# Patient Record
Sex: MALE | Race: WHITE | Employment: FULL TIME | URBAN - METROPOLITAN AREA
[De-identification: names, ages, dates, MRNs, and addresses within clinical notes are randomized per-mention and may not be internally consistent; named-entity substitution may affect disease eponyms.]

---

## 2017-01-17 ENCOUNTER — TELEPHONE (OUTPATIENT)
Dept: PRIMARY CARE CLINIC | Age: 47
End: 2017-01-17

## 2017-01-18 DIAGNOSIS — E66.1 MORBID DRUG-INDUCED OBESITY: Primary | ICD-10-CM

## 2017-01-27 ENCOUNTER — NURSE ONLY (OUTPATIENT)
Dept: PRIMARY CARE CLINIC | Age: 47
End: 2017-01-27

## 2017-01-27 DIAGNOSIS — E29.1 HYPOGONADISM IN MALE: Primary | ICD-10-CM

## 2017-01-27 PROCEDURE — 96372 THER/PROPH/DIAG INJ SC/IM: CPT | Performed by: FAMILY MEDICINE

## 2017-01-27 RX ORDER — TESTOSTERONE CYPIONATE 200 MG/ML
200 INJECTION INTRAMUSCULAR ONCE
Status: COMPLETED | OUTPATIENT
Start: 2017-01-27 | End: 2017-01-27

## 2017-01-27 RX ADMIN — TESTOSTERONE CYPIONATE 200 MG: 200 INJECTION INTRAMUSCULAR at 09:12

## 2017-02-08 ENCOUNTER — HOSPITAL ENCOUNTER (OUTPATIENT)
Dept: NUTRITION | Age: 47
Discharge: HOME | End: 2017-02-08
Payer: COMMERCIAL

## 2017-02-08 PROCEDURE — 97802 MEDICAL NUTRITION INDIV IN: CPT

## 2017-02-17 ENCOUNTER — NURSE ONLY (OUTPATIENT)
Dept: PRIMARY CARE CLINIC | Age: 47
End: 2017-02-17

## 2017-02-17 DIAGNOSIS — E29.1 HYPOGONADISM IN MALE: Primary | ICD-10-CM

## 2017-02-17 PROCEDURE — 96372 THER/PROPH/DIAG INJ SC/IM: CPT | Performed by: FAMILY MEDICINE

## 2017-02-17 RX ORDER — TESTOSTERONE CYPIONATE 200 MG/ML
200 INJECTION INTRAMUSCULAR ONCE
Status: COMPLETED | OUTPATIENT
Start: 2017-02-17 | End: 2017-02-17

## 2017-02-17 RX ADMIN — TESTOSTERONE CYPIONATE 200 MG: 200 INJECTION INTRAMUSCULAR at 08:28

## 2019-01-21 ENCOUNTER — OFFICE VISIT (OUTPATIENT)
Dept: PRIMARY CARE CLINIC | Age: 49
End: 2019-01-21
Payer: COMMERCIAL

## 2019-01-21 VITALS
HEIGHT: 73 IN | OXYGEN SATURATION: 98 % | DIASTOLIC BLOOD PRESSURE: 84 MMHG | SYSTOLIC BLOOD PRESSURE: 138 MMHG | TEMPERATURE: 98.5 F | HEART RATE: 68 BPM | RESPIRATION RATE: 16 BRPM | WEIGHT: 315 LBS | BODY MASS INDEX: 41.75 KG/M2

## 2019-01-21 DIAGNOSIS — M51.36 DDD (DEGENERATIVE DISC DISEASE), LUMBAR: ICD-10-CM

## 2019-01-21 DIAGNOSIS — N52.01 ERECTILE DYSFUNCTION DUE TO ARTERIAL INSUFFICIENCY: ICD-10-CM

## 2019-01-21 DIAGNOSIS — E78.5 DYSLIPIDEMIA: ICD-10-CM

## 2019-01-21 DIAGNOSIS — I10 HTN (HYPERTENSION), BENIGN: Primary | ICD-10-CM

## 2019-01-21 DIAGNOSIS — R73.9 HYPERGLYCEMIA: ICD-10-CM

## 2019-01-21 DIAGNOSIS — R53.82 CHRONIC FATIGUE: ICD-10-CM

## 2019-01-21 PROCEDURE — G8419 CALC BMI OUT NRM PARAM NOF/U: HCPCS | Performed by: FAMILY MEDICINE

## 2019-01-21 PROCEDURE — G8484 FLU IMMUNIZE NO ADMIN: HCPCS | Performed by: FAMILY MEDICINE

## 2019-01-21 PROCEDURE — G8427 DOCREV CUR MEDS BY ELIG CLIN: HCPCS | Performed by: FAMILY MEDICINE

## 2019-01-21 PROCEDURE — 4004F PT TOBACCO SCREEN RCVD TLK: CPT | Performed by: FAMILY MEDICINE

## 2019-01-21 PROCEDURE — 99214 OFFICE O/P EST MOD 30 MIN: CPT | Performed by: FAMILY MEDICINE

## 2019-01-21 RX ORDER — METFORMIN HYDROCHLORIDE 500 MG/1
TABLET, EXTENDED RELEASE ORAL
COMMUNITY
Start: 2018-12-28

## 2019-01-21 RX ORDER — OXYCODONE HYDROCHLORIDE AND ACETAMINOPHEN 5; 325 MG/1; MG/1
TABLET ORAL
COMMUNITY
Start: 2018-12-27

## 2019-01-21 RX ORDER — SILDENAFIL 100 MG/1
100 TABLET, FILM COATED ORAL DAILY PRN
Qty: 10 TABLET | Refills: 2 | Status: SHIPPED | OUTPATIENT
Start: 2019-01-21

## 2019-01-21 RX ORDER — FENOFIBRATE 160 MG/1
TABLET ORAL
COMMUNITY
Start: 2019-01-06

## 2019-01-21 RX ORDER — LISINOPRIL 40 MG/1
TABLET ORAL
COMMUNITY
Start: 2019-01-03

## 2019-01-21 RX ORDER — ATORVASTATIN CALCIUM 20 MG/1
TABLET, FILM COATED ORAL
COMMUNITY
Start: 2018-12-29 | End: 2019-01-21

## 2019-01-21 RX ORDER — METOPROLOL SUCCINATE 100 MG/1
TABLET, EXTENDED RELEASE ORAL
COMMUNITY
Start: 2019-01-14

## 2019-01-21 RX ORDER — ALBUTEROL SULFATE 2.5 MG/3ML
SOLUTION RESPIRATORY (INHALATION)
COMMUNITY
Start: 2018-11-26

## 2019-01-21 RX ORDER — DULOXETIN HYDROCHLORIDE 60 MG/1
CAPSULE, DELAYED RELEASE ORAL
COMMUNITY
Start: 2019-01-14

## 2019-01-21 RX ORDER — BACLOFEN 10 MG/1
TABLET ORAL
COMMUNITY
Start: 2016-01-14

## 2019-01-21 RX ORDER — GLIPIZIDE 5 MG/1
TABLET ORAL
COMMUNITY
Start: 2019-01-14

## 2019-01-21 RX ORDER — AMLODIPINE BESYLATE 5 MG/1
TABLET ORAL
COMMUNITY
Start: 2019-01-14 | End: 2019-01-21

## 2019-01-21 RX ORDER — SILDENAFIL CITRATE 20 MG/1
20 TABLET ORAL PRN
Qty: 30 TABLET | Refills: 2 | Status: SHIPPED | OUTPATIENT
Start: 2019-01-21 | End: 2019-01-21 | Stop reason: SDUPTHER

## 2019-01-21 RX ORDER — SILDENAFIL CITRATE 20 MG/1
20 TABLET ORAL PRN
Qty: 30 TABLET | Refills: 2 | Status: SHIPPED | OUTPATIENT
Start: 2019-01-21

## 2019-01-21 ASSESSMENT — ENCOUNTER SYMPTOMS
EYES NEGATIVE: 1
RESPIRATORY NEGATIVE: 1
NAUSEA: 0
PHOTOPHOBIA: 0
DIARRHEA: 0
SHORTNESS OF BREATH: 0
CONSTIPATION: 0
BACK PAIN: 1
VOMITING: 0
ABDOMINAL PAIN: 0
BOWEL INCONTINENCE: 0
APNEA: 0
EYE DISCHARGE: 0
COUGH: 0
BLOOD IN STOOL: 0
CHEST TIGHTNESS: 0
GASTROINTESTINAL NEGATIVE: 1

## 2019-01-21 ASSESSMENT — PATIENT HEALTH QUESTIONNAIRE - PHQ9
SUM OF ALL RESPONSES TO PHQ QUESTIONS 1-9: 0
SUM OF ALL RESPONSES TO PHQ QUESTIONS 1-9: 0
1. LITTLE INTEREST OR PLEASURE IN DOING THINGS: 0
SUM OF ALL RESPONSES TO PHQ9 QUESTIONS 1 & 2: 0
2. FEELING DOWN, DEPRESSED OR HOPELESS: 0

## 2019-01-24 DIAGNOSIS — E78.5 DYSLIPIDEMIA: ICD-10-CM

## 2019-01-24 DIAGNOSIS — R53.82 CHRONIC FATIGUE: ICD-10-CM

## 2019-01-24 LAB
ALBUMIN SERPL-MCNC: 4.4 G/DL (ref 3.9–4.9)
ALP BLD-CCNC: 64 U/L (ref 35–104)
ALT SERPL-CCNC: 21 U/L (ref 0–41)
ANION GAP SERPL CALCULATED.3IONS-SCNC: 13 MEQ/L (ref 7–13)
AST SERPL-CCNC: 38 U/L (ref 0–40)
BASOPHILS ABSOLUTE: 0.1 K/UL (ref 0–0.2)
BASOPHILS RELATIVE PERCENT: 1 %
BILIRUB SERPL-MCNC: 0.3 MG/DL (ref 0–1.2)
BUN BLDV-MCNC: 14 MG/DL (ref 6–20)
CALCIUM SERPL-MCNC: 9.6 MG/DL (ref 8.6–10.2)
CHLORIDE BLD-SCNC: 93 MEQ/L (ref 98–107)
CHOLESTEROL, TOTAL: 132 MG/DL (ref 0–199)
CO2: 27 MEQ/L (ref 22–29)
CREAT SERPL-MCNC: 0.65 MG/DL (ref 0.7–1.2)
EOSINOPHILS ABSOLUTE: 0.3 K/UL (ref 0–0.7)
EOSINOPHILS RELATIVE PERCENT: 2.9 %
GFR AFRICAN AMERICAN: >60
GFR NON-AFRICAN AMERICAN: >60
GLOBULIN: 3.2 G/DL (ref 2.3–3.5)
GLUCOSE BLD-MCNC: 155 MG/DL (ref 74–109)
HCT VFR BLD CALC: 40 % (ref 42–52)
HDLC SERPL-MCNC: 21 MG/DL (ref 40–59)
HEMOGLOBIN: 13.5 G/DL (ref 14–18)
LDL CHOLESTEROL CALCULATED: ABNORMAL MG/DL (ref 0–129)
LYMPHOCYTES ABSOLUTE: 2.2 K/UL (ref 1–4.8)
LYMPHOCYTES RELATIVE PERCENT: 19.7 %
MCH RBC QN AUTO: 29.3 PG (ref 27–31.3)
MCHC RBC AUTO-ENTMCNC: 33.9 % (ref 33–37)
MCV RBC AUTO: 86.7 FL (ref 80–100)
MONOCYTES ABSOLUTE: 0.5 K/UL (ref 0.2–0.8)
MONOCYTES RELATIVE PERCENT: 4.5 %
NEUTROPHILS ABSOLUTE: 8 K/UL (ref 1.4–6.5)
NEUTROPHILS RELATIVE PERCENT: 71.9 %
PDW BLD-RTO: 16.7 % (ref 11.5–14.5)
PLATELET # BLD: 245 K/UL (ref 130–400)
POTASSIUM SERPL-SCNC: 4.6 MEQ/L (ref 3.5–5.1)
RBC # BLD: 4.61 M/UL (ref 4.7–6.1)
SLIDE REVIEW: ABNORMAL
SODIUM BLD-SCNC: 133 MEQ/L (ref 132–144)
TOTAL PROTEIN: 7.6 G/DL (ref 6.4–8.1)
TRIGL SERPL-MCNC: 515 MG/DL (ref 0–200)
TSH SERPL DL<=0.05 MIU/L-ACNC: 6.11 UIU/ML (ref 0.27–4.2)
VITAMIN D 25-HYDROXY: 16.9 NG/ML (ref 30–100)
WBC # BLD: 11.1 K/UL (ref 4.8–10.8)

## 2019-01-26 LAB
SEX HORMONE BINDING GLOBULIN: 17 NMOL/L (ref 11–80)
TESTOSTERONE FREE PERCENT: 2.4 % (ref 1.6–2.9)
TESTOSTERONE FREE, CALC: 46 PG/ML (ref 47–244)
TESTOSTERONE TOTAL-MALE: 194 NG/DL (ref 300–890)

## 2019-03-11 ENCOUNTER — TELEPHONE (OUTPATIENT)
Dept: ADMINISTRATIVE | Age: 49
End: 2019-03-11

## 2023-09-07 PROBLEM — E78.1 HYPERTRIGLYCERIDEMIA: Status: ACTIVE | Noted: 2023-09-07

## 2023-09-07 PROBLEM — M10.9 GOUT: Status: ACTIVE | Noted: 2023-09-07

## 2023-09-07 PROBLEM — Z72.0 TOBACCO ABUSE: Status: ACTIVE | Noted: 2023-09-07

## 2023-09-07 PROBLEM — E78.2 MIXED HYPERLIPIDEMIA: Status: ACTIVE | Noted: 2023-09-07

## 2023-09-07 PROBLEM — Z78.9 KNOWN MEDICAL PROBLEMS: Status: ACTIVE | Noted: 2023-09-07

## 2023-09-07 PROBLEM — R06.81 WITNESSED EPISODE OF APNEA: Status: ACTIVE | Noted: 2023-09-07

## 2023-09-07 PROBLEM — E55.9 VITAMIN D DEFICIENCY: Status: ACTIVE | Noted: 2023-09-07

## 2023-09-07 PROBLEM — F41.9 ANXIETY: Status: ACTIVE | Noted: 2023-09-07

## 2023-09-07 PROBLEM — G89.4 CHRONIC PAIN SYNDROME: Status: ACTIVE | Noted: 2023-09-07

## 2023-09-07 PROBLEM — E11.29 TYPE 2 DIABETES MELLITUS WITH OTHER DIABETIC KIDNEY COMPLICATION (MULTI): Status: ACTIVE | Noted: 2023-09-07

## 2023-09-07 PROBLEM — F40.01 AGORAPHOBIA WITH PANIC ATTACKS: Status: ACTIVE | Noted: 2023-09-07

## 2023-09-07 PROBLEM — R06.83 SNORING: Status: ACTIVE | Noted: 2023-09-07

## 2023-09-07 PROBLEM — K21.9 GERD (GASTROESOPHAGEAL REFLUX DISEASE): Status: ACTIVE | Noted: 2023-09-07

## 2023-09-07 PROBLEM — R22.9 SKIN NODULE: Status: ACTIVE | Noted: 2023-09-07

## 2023-09-07 PROBLEM — Z79.891 LONG TERM (CURRENT) USE OF OPIATE ANALGESIC: Status: ACTIVE | Noted: 2023-09-07

## 2023-09-07 PROBLEM — R20.8 BURNING SENSATION: Status: ACTIVE | Noted: 2023-09-07

## 2023-09-07 PROBLEM — G89.29 CHRONIC BILATERAL LOW BACK PAIN WITH RIGHT-SIDED SCIATICA: Status: ACTIVE | Noted: 2023-09-07

## 2023-09-07 PROBLEM — B35.1 NAIL FUNGUS: Status: ACTIVE | Noted: 2023-09-07

## 2023-09-07 PROBLEM — F32.A DEPRESSION: Status: ACTIVE | Noted: 2023-09-07

## 2023-09-07 PROBLEM — J30.2 SEASONAL ALLERGIES: Status: ACTIVE | Noted: 2023-09-07

## 2023-09-07 PROBLEM — F17.200 NICOTINE DEPENDENCE: Status: ACTIVE | Noted: 2023-09-07

## 2023-09-07 PROBLEM — E66.01 MORBID OBESITY WITH BMI OF 40.0-44.9, ADULT (MULTI): Status: ACTIVE | Noted: 2023-09-07

## 2023-09-07 PROBLEM — R25.2 LEG CRAMPS: Status: ACTIVE | Noted: 2023-09-07

## 2023-09-07 PROBLEM — Z91.199 POOR COMPLIANCE: Status: ACTIVE | Noted: 2023-09-07

## 2023-09-07 PROBLEM — N52.9 ERECTILE DYSFUNCTION: Status: ACTIVE | Noted: 2023-09-07

## 2023-09-07 PROBLEM — R41.3 MEMORY IMPAIRMENT: Status: ACTIVE | Noted: 2023-09-07

## 2023-09-07 PROBLEM — I10 BENIGN ESSENTIAL HYPERTENSION: Status: ACTIVE | Noted: 2023-09-07

## 2023-09-07 PROBLEM — M25.561 RIGHT KNEE PAIN: Status: ACTIVE | Noted: 2023-09-07

## 2023-09-07 PROBLEM — B07.9 WARTS: Status: ACTIVE | Noted: 2023-09-07

## 2023-09-07 PROBLEM — R80.9 MICROALBUMINURIA DUE TO TYPE 2 DIABETES MELLITUS (MULTI): Status: ACTIVE | Noted: 2023-09-07

## 2023-09-07 PROBLEM — Z87.19 HISTORY OF DIVERTICULITIS: Status: ACTIVE | Noted: 2023-09-07

## 2023-09-07 PROBLEM — M25.531 RIGHT WRIST PAIN: Status: ACTIVE | Noted: 2023-09-07

## 2023-09-07 PROBLEM — M54.41 CHRONIC BILATERAL LOW BACK PAIN WITH RIGHT-SIDED SCIATICA: Status: ACTIVE | Noted: 2023-09-07

## 2023-09-07 PROBLEM — G47.19 EXCESSIVE DAYTIME SLEEPINESS: Status: ACTIVE | Noted: 2023-09-07

## 2023-09-07 PROBLEM — M25.551 RIGHT HIP PAIN: Status: ACTIVE | Noted: 2023-09-07

## 2023-09-07 PROBLEM — M25.511 SHOULDER PAIN, RIGHT: Status: ACTIVE | Noted: 2023-09-07

## 2023-09-07 PROBLEM — E11.29 MICROALBUMINURIA DUE TO TYPE 2 DIABETES MELLITUS (MULTI): Status: ACTIVE | Noted: 2023-09-07

## 2023-09-07 PROBLEM — I48.0 PAROXYSMAL ATRIAL FIBRILLATION (MULTI): Status: ACTIVE | Noted: 2023-09-07

## 2023-09-07 PROBLEM — I48.91 ATRIAL FIBRILLATION (MULTI): Status: ACTIVE | Noted: 2023-09-07

## 2023-09-07 PROBLEM — G47.33 OBSTRUCTIVE SLEEP APNEA: Status: ACTIVE | Noted: 2023-09-07

## 2023-09-07 PROBLEM — M70.70 HIP BURSITIS: Status: ACTIVE | Noted: 2023-09-07

## 2023-09-07 PROBLEM — J44.9 COPD (CHRONIC OBSTRUCTIVE PULMONARY DISEASE) (MULTI): Status: ACTIVE | Noted: 2023-09-07

## 2023-09-07 RX ORDER — INSULIN GLARGINE 100 [IU]/ML
30 INJECTION, SOLUTION SUBCUTANEOUS
COMMUNITY
Start: 2023-08-07 | End: 2024-08-06

## 2023-09-07 RX ORDER — ERGOCALCIFEROL 1.25 MG/1
1 CAPSULE ORAL
COMMUNITY
Start: 2021-08-06 | End: 2023-11-27

## 2023-09-07 RX ORDER — GUAIFENESIN AND DEXTROMETHORPHAN HYDROBROMIDE 1200; 60 MG/1; MG/1
1 TABLET, EXTENDED RELEASE ORAL EVERY 12 HOURS
COMMUNITY
Start: 2021-10-04

## 2023-09-07 RX ORDER — AMLODIPINE BESYLATE 10 MG/1
1 TABLET ORAL DAILY
COMMUNITY
Start: 2017-04-28 | End: 2024-01-24

## 2023-09-07 RX ORDER — NICOTINE POLACRILEX 4 MG/1
4 LOZENGE ORAL EVERY 2 HOUR PRN
COMMUNITY

## 2023-09-07 RX ORDER — ROSUVASTATIN CALCIUM 10 MG/1
1 TABLET, COATED ORAL NIGHTLY
COMMUNITY
Start: 2021-11-04

## 2023-09-07 RX ORDER — FAMOTIDINE 40 MG/1
1 TABLET, FILM COATED ORAL DAILY
COMMUNITY
Start: 2021-07-02 | End: 2024-01-26

## 2023-09-07 RX ORDER — PHENOL 1.4 %
1 AEROSOL, SPRAY (ML) MUCOUS MEMBRANE DAILY
COMMUNITY
Start: 2020-04-29

## 2023-09-07 RX ORDER — METFORMIN HYDROCHLORIDE 500 MG/1
1 TABLET, EXTENDED RELEASE ORAL DAILY
COMMUNITY
Start: 2017-04-28 | End: 2024-01-10 | Stop reason: WASHOUT

## 2023-09-07 RX ORDER — DULOXETIN HYDROCHLORIDE 60 MG/1
1 CAPSULE, DELAYED RELEASE ORAL DAILY
COMMUNITY
Start: 2017-02-20 | End: 2024-01-10 | Stop reason: SDUPTHER

## 2023-09-07 RX ORDER — FLUTICASONE PROPIONATE 50 MCG
1 SPRAY, SUSPENSION (ML) NASAL 2 TIMES DAILY
COMMUNITY

## 2023-09-07 RX ORDER — OXYCODONE AND ACETAMINOPHEN 5; 325 MG/1; MG/1
1 TABLET ORAL 3 TIMES DAILY PRN
COMMUNITY
Start: 2023-01-01 | End: 2023-10-12 | Stop reason: SDUPTHER

## 2023-09-07 RX ORDER — GLIPIZIDE 5 MG/1
2 TABLET ORAL
COMMUNITY
Start: 2020-08-12 | End: 2024-04-25

## 2023-09-07 RX ORDER — METOPROLOL SUCCINATE 50 MG/1
3 TABLET, EXTENDED RELEASE ORAL EVERY MORNING
COMMUNITY
Start: 2019-07-22 | End: 2024-04-01 | Stop reason: SDUPTHER

## 2023-09-07 RX ORDER — IBUPROFEN 200 MG
1 TABLET ORAL DAILY
COMMUNITY

## 2023-09-07 RX ORDER — IBUPROFEN 800 MG/1
800 TABLET ORAL 3 TIMES DAILY PRN
COMMUNITY
Start: 2017-02-20 | End: 2023-11-27

## 2023-09-07 RX ORDER — OMEGA-3-ACID ETHYL ESTERS 1 G/1
2 CAPSULE, LIQUID FILLED ORAL 2 TIMES DAILY
COMMUNITY
Start: 2021-09-15

## 2023-09-07 RX ORDER — ALBUTEROL SULFATE 0.83 MG/ML
2.5 SOLUTION RESPIRATORY (INHALATION)
COMMUNITY
Start: 2018-11-26

## 2023-09-07 RX ORDER — LANCETS
EACH MISCELLANEOUS
COMMUNITY

## 2023-09-07 RX ORDER — LEVOTHYROXINE SODIUM 50 UG/1
50 TABLET ORAL DAILY
COMMUNITY

## 2023-09-07 RX ORDER — FENOFIBRATE 145 MG/1
1 TABLET, FILM COATED ORAL DAILY
COMMUNITY
Start: 2020-09-10

## 2023-09-07 RX ORDER — TRIAMCINOLONE ACETONIDE 1 MG/G
CREAM TOPICAL
COMMUNITY
Start: 2022-08-17

## 2023-09-07 RX ORDER — NALOXONE HYDROCHLORIDE 4 MG/.1ML
4 SPRAY NASAL AS NEEDED
COMMUNITY
Start: 2019-10-30

## 2023-09-07 RX ORDER — COLCHICINE 0.6 MG/1
TABLET ORAL DAILY
COMMUNITY

## 2023-09-07 RX ORDER — LISINOPRIL 40 MG/1
1 TABLET ORAL DAILY
COMMUNITY
Start: 2017-02-20 | End: 2024-02-26

## 2023-09-07 RX ORDER — ESOMEPRAZOLE MAGNESIUM 40 MG/1
40 CAPSULE, DELAYED RELEASE ORAL DAILY
COMMUNITY
End: 2024-04-25

## 2023-10-12 ENCOUNTER — LAB (OUTPATIENT)
Dept: LAB | Facility: LAB | Age: 53
End: 2023-10-12
Payer: COMMERCIAL

## 2023-10-12 ENCOUNTER — OFFICE VISIT (OUTPATIENT)
Dept: PRIMARY CARE | Facility: CLINIC | Age: 53
End: 2023-10-12
Payer: COMMERCIAL

## 2023-10-12 VITALS
BODY MASS INDEX: 41.75 KG/M2 | DIASTOLIC BLOOD PRESSURE: 77 MMHG | OXYGEN SATURATION: 94 % | HEIGHT: 73 IN | TEMPERATURE: 96.8 F | HEART RATE: 78 BPM | SYSTOLIC BLOOD PRESSURE: 134 MMHG | WEIGHT: 315 LBS

## 2023-10-12 DIAGNOSIS — M54.41 CHRONIC BILATERAL LOW BACK PAIN WITH RIGHT-SIDED SCIATICA: ICD-10-CM

## 2023-10-12 DIAGNOSIS — I48.0 PAROXYSMAL ATRIAL FIBRILLATION (MULTI): ICD-10-CM

## 2023-10-12 DIAGNOSIS — G89.29 CHRONIC BILATERAL LOW BACK PAIN WITH RIGHT-SIDED SCIATICA: ICD-10-CM

## 2023-10-12 DIAGNOSIS — J44.9 CHRONIC OBSTRUCTIVE PULMONARY DISEASE, UNSPECIFIED COPD TYPE (MULTI): ICD-10-CM

## 2023-10-12 DIAGNOSIS — Z28.21 INFLUENZA VACCINATION DECLINED: ICD-10-CM

## 2023-10-12 DIAGNOSIS — E11.29 TYPE 2 DIABETES MELLITUS WITH OTHER DIABETIC KIDNEY COMPLICATION (MULTI): ICD-10-CM

## 2023-10-12 DIAGNOSIS — E78.2 MIXED HYPERLIPIDEMIA: ICD-10-CM

## 2023-10-12 DIAGNOSIS — I10 BENIGN ESSENTIAL HYPERTENSION: Primary | ICD-10-CM

## 2023-10-12 LAB
AMPHETAMINES UR QL SCN: ABNORMAL
BARBITURATES UR QL SCN: ABNORMAL
BENZODIAZ UR QL SCN: ABNORMAL
BZE UR QL SCN: ABNORMAL
CANNABINOIDS UR QL SCN: ABNORMAL
FENTANYL+NORFENTANYL UR QL SCN: ABNORMAL
OPIATES UR QL SCN: ABNORMAL
OXYCODONE+OXYMORPHONE UR QL SCN: ABNORMAL
PCP UR QL SCN: ABNORMAL

## 2023-10-12 PROCEDURE — 3075F SYST BP GE 130 - 139MM HG: CPT | Performed by: INTERNAL MEDICINE

## 2023-10-12 PROCEDURE — 3078F DIAST BP <80 MM HG: CPT | Performed by: INTERNAL MEDICINE

## 2023-10-12 PROCEDURE — 80307 DRUG TEST PRSMV CHEM ANLYZR: CPT

## 2023-10-12 PROCEDURE — 80361 OPIATES 1 OR MORE: CPT

## 2023-10-12 PROCEDURE — 99214 OFFICE O/P EST MOD 30 MIN: CPT | Performed by: INTERNAL MEDICINE

## 2023-10-12 PROCEDURE — 4010F ACE/ARB THERAPY RXD/TAKEN: CPT | Performed by: INTERNAL MEDICINE

## 2023-10-12 PROCEDURE — 3066F NEPHROPATHY DOC TX: CPT | Performed by: INTERNAL MEDICINE

## 2023-10-12 PROCEDURE — 80365 DRUG SCREENING OXYCODONE: CPT

## 2023-10-12 PROCEDURE — 1036F TOBACCO NON-USER: CPT | Performed by: INTERNAL MEDICINE

## 2023-10-12 RX ORDER — OXYCODONE AND ACETAMINOPHEN 5; 325 MG/1; MG/1
1 TABLET ORAL EVERY 8 HOURS PRN
Qty: 90 TABLET | Refills: 0 | Status: SHIPPED | OUTPATIENT
Start: 2023-12-31 | End: 2024-01-30

## 2023-10-12 RX ORDER — DULAGLUTIDE 4.5 MG/.5ML
4.5 INJECTION, SOLUTION SUBCUTANEOUS
COMMUNITY
Start: 2023-10-10 | End: 2024-04-01 | Stop reason: ALTCHOICE

## 2023-10-12 RX ORDER — OXYCODONE AND ACETAMINOPHEN 5; 325 MG/1; MG/1
1 TABLET ORAL EVERY 8 HOURS PRN
Qty: 90 TABLET | Refills: 0 | Status: SHIPPED | OUTPATIENT
Start: 2023-12-02 | End: 2024-01-01

## 2023-10-12 RX ORDER — OXYCODONE AND ACETAMINOPHEN 5; 325 MG/1; MG/1
1 TABLET ORAL 3 TIMES DAILY PRN
Qty: 90 TABLET | Refills: 0 | Status: SHIPPED | OUTPATIENT
Start: 2023-11-02 | End: 2024-01-12 | Stop reason: SDUPTHER

## 2023-10-12 NOTE — PROGRESS NOTES
Subjective   Patient ID: Jose Eduardo Diaz is a 53 y.o. male who presents for Back Pain.    HPI     History reviewed and updated.  He has been trying to improve his nutrition - eating more greens, veggies.  Sees endocrinologist at [facility].    No change in chronic pain, needs refills on his oxycodone.    Was recently in ER for scrotum laceration - has stitches and is due to have them removed in 3 more days.    OARRS:  Sharron Kearney MD on 10/12/2023  9:53 AM  I have personally reviewed the OARRS report for Jose Eduardo Diaz. I have considered the risks of abuse, dependence, addiction and diversion and I believe that it is clinically appropriate for Jose Eduardo Diaz to be prescribed this medication    Is the patient prescribed a combination of a benzodiazepine and opioid?  No    Last Urine Drug Screen / ordered today: Yes  Recent Results (from the past 8760 hour(s))   Drug Screen, Urine With Reflex to Confirmation    Collection Time: 10/12/23 11:28 AM   Result Value Ref Range    Amphetamine Screen, Urine Presumptive Negative Presumptive Negative    Barbiturate Screen, Urine Presumptive Negative Presumptive Negative    Benzodiazepines Screen, Urine Presumptive Negative Presumptive Negative    Cannabinoid Screen, Urine Presumptive Negative Presumptive Negative    Cocaine Metabolite Screen, Urine Presumptive Negative Presumptive Negative    Fentanyl Screen, Urine Presumptive Negative Presumptive Negative    Opiate Screen, Urine Presumptive Negative Presumptive Negative    Oxycodone Screen, Urine Presumptive Positive (A) Presumptive Negative    PCP Screen, Urine Presumptive Negative Presumptive Negative   OPIATE/OPIOID/BENZO PRESCRIPTION COMPLIANCE    Collection Time: 11/01/22 10:30 AM   Result Value Ref Range    DRUG SCREEN COMMENT URINE SEE BELOW     Creatine, Urine 49.1 mg/dL    Amphetamine Screen, Urine PRESUMPTIVE NEGATIVE NEGATIVE    Barbiturate Screen, Urine PRESUMPTIVE NEGATIVE NEGATIVE    Cannabinoid Screen,  Urine PRESUMPTIVE NEGATIVE NEGATIVE    Cocaine Screen, Urine PRESUMPTIVE NEGATIVE NEGATIVE    PCP Screen, Urine PRESUMPTIVE NEGATIVE NEGATIVE    7-Aminoclonazepam <25 Cutoff <25 ng/mL    Alpha-Hydroxyalprazolam <25 Cutoff <25 ng/mL    Alpha-Hydroxymidazolam <25 Cutoff <25 ng/mL    Alprazolam <25 Cutoff <25 ng/mL    Chlordiazepoxide <25 Cutoff <25 ng/mL    Clonazepam <25 Cutoff <25 ng/mL    Diazepam <25 Cutoff <25 ng/mL    Lorazepam <25 Cutoff <25 ng/mL    Midazolam <25 Cutoff <25 ng/mL    Nordiazepam <25 Cutoff <25 ng/mL    Oxazepam <25 Cutoff <25 ng/mL    Temazepam <25 Cutoff <25 ng/mL    Zolpidem <25 Cutoff <25 ng/mL    Zolpidem Metabolite (ZCA) <25 Cutoff <25 ng/mL    6-Acetylmorphine <25 Cutoff <25 ng/mL    Codeine <50 Cutoff <50 ng/mL    Hydrocodone <25 Cutoff <25 ng/mL    Hydromorphone <25 Cutoff <25 ng/mL    Morphine Urine <50 Cutoff <50 ng/mL    Norhydrocodone <25 Cutoff <25 ng/mL    Noroxycodone 295 (A) Cutoff <25 ng/mL    Oxycodone 600 (A) Cutoff <25 ng/mL    Oxymorphone 287 (A) Cutoff <25 ng/mL    Tramadol <50 Cutoff <50 ng/mL    O-Desmethyltramadol <50 Cutoff <50 ng/mL    Fentanyl <2.5 Cutoff<2.5 ng/mL    Norfentanyl <2.5 Cutoff<2.5 ng/mL    METHADONE CONFIRMATION,URINE <25 Cutoff <25 ng/mL    EDDP <25 Cutoff <25 ng/mL     Results are as expected.         Controlled Substance Agreement:  Date of the Last Agreement: 10/12/2023  Reviewed Controlled Substance Agreement including but not limited to the benefits, risks, and alternatives to treatment with a Controlled Substance medication(s).    Opioids:  What is the patient's goal of therapy? Pain relief,improved function  Is this being achieved with current treatment? yes    I have calculated the patient's Morphine Dose Equivalent (MED):   I have considered referral to Pain Management and/or a specialist, and do not feel it is necessary at this time.    I feel that it is clinically indicated to continue this current medication regimen after consideration of  alternative therapies, and other non-opioid treatment.    Opioid Risk Screening:  Family History of Substance Abuse  Alcohol: 0 (10/13/2023  7:00 AM)  Illegal Drugs: 3 (10/13/2023  7:00 AM)  Prescription Drugs: 0 (10/13/2023  7:00 AM)    Personal History of Substance Abuse  Alcohol: 0 (10/13/2023  7:00 AM)  Illegal Drugs: 0 (10/13/2023  7:00 AM)  Prescription Drugs: 0 (10/13/2023  7:00 AM)    Patient Age (16-45)  Age (16-45): 0 (10/13/2023  7:00 AM)    History of Preadolescent Sexual Abuse  History of Preadolescent Sexual Abuse: .0 (10/13/2023  7:00 AM)    Psychological Disease  Attention Deficit Disorder, Obsessive Compulsive Disorder, Bipolar, Schizophrenia: 0 (10/13/2023  7:00 AM)  Depression: 1 (10/13/2023  7:00 AM)    Total Score  Total Score: 4 (10/13/2023  7:00 AM)    Total Score Risk Category  TOTAL SCORE CATEGORY: Moderate Risk (4-7) (10/13/2023  7:00 AM)        Pain Assessment:  Analgesia  What was your pain level on average during the past week?: 6  What was your pain level at its worst during the past week?: 8  What percentage of your pain has been relieved during the past week?: 70 %  Is the amount of pain relief you are now obtaining from your current pain relievers enough to make a real difference in your life?: Y  Query to Clinician: Is the patient's pain relief clinically significant?: Yes    Activities of Daily Living  Physical Functioning: Better  Family Relationships: Better  Social Relationships: Better  Mood: Same  Sleep Patterns: Same  Overall Functioning: Better    Adverse Events  Is patient experiencing any side effects from current pain relievers?: N      Assessment  Is your overall impression that this patient is benefiting from opioid therapy?: Yes  Specific Analgesic Plan: Continue present regimen        Review of Systems   Constitutional:  Positive for fatigue. Negative for chills and fever.   Respiratory:  Negative for cough, shortness of breath and wheezing.    Cardiovascular:   "Negative for chest pain, palpitations and leg swelling.   Gastrointestinal:  Negative for abdominal pain, constipation, diarrhea and nausea.   Genitourinary:  Negative for dysuria and hematuria.   Musculoskeletal:  Positive for arthralgias, back pain and gait problem.   Skin:  Positive for wound.   Neurological:  Negative for dizziness.       Objective   /77   Pulse 78   Temp 36 °C (96.8 °F)   Ht 1.854 m (6' 1\")   Wt 147 kg (324 lb 6.4 oz)   SpO2 94%   BMI 42.80 kg/m²     Physical Exam    Assessment/Plan   Problem List Items Addressed This Visit             ICD-10-CM    Benign essential hypertension - Primary I10    COPD (chronic obstructive pulmonary disease) (CMS/[facility]) J44.9    Mixed hyperlipidemia E78.2    Paroxysmal atrial fibrillation (CMS/[facility]) I48.0    Type 2 diabetes mellitus with other diabetic kidney complication (CMS/[facility]) E11.29    Chronic bilateral low back pain with right-sided sciatica M54.41, G89.29    Relevant Medications    oxyCODONE-acetaminophen (Percocet) 5-325 mg tablet (Start on 11/2/2023)    oxyCODONE-acetaminophen (Percocet) 5-325 mg tablet (Start on 12/2/2023)    oxyCODONE-acetaminophen (Percocet) 5-325 mg tablet (Start on 12/31/2023)    Other Relevant Orders    Drug Screen, Urine With Reflex to Confirmation (Completed)     Chronic low back pain with right sciatica, lumbar degenerative disc disease, chronic opiate use (Percocet) - has tried pain management injections in the past without improvement. Pain is currently well controlled with Percocet, approximately 3 tabs per day. Opioid risk score is 4 (3 points for family history of substance abuse ) - Jose Eduardo's pain is consistent with his disc disease and he gets adequate relief from his medication. I do not believe he poses a high risk for misuse of medication. The Controlled Substance Agreement is up to date from 10/12/2023. OARRS reviewed. Urine drug screen 10/12/2023. Narcan 9/7/23. Percocet refilled.     Hypertension - at goal, " continue current medication and healthy habits.    Mixed hyperlipidemia & hypertriglyceridemia - continue statin, fenofibrate.    DM2 with microalbuminuria, uncontrolled - following with endocrinologist at [facility].     Hypothyroidism - following with endocrinologist at [facility].     Patient permanently declines flu shot. Declines COVID vaccine.    Follow-up in 90 days and as needed.

## 2023-10-13 ASSESSMENT — ENCOUNTER SYMPTOMS
NAUSEA: 0
DIZZINESS: 0
HEMATURIA: 0
ARTHRALGIAS: 1
ABDOMINAL PAIN: 0
COUGH: 0
DIARRHEA: 0
CHILLS: 0
SHORTNESS OF BREATH: 0
WHEEZING: 0
FATIGUE: 1
CONSTIPATION: 0
PALPITATIONS: 0
FEVER: 0
BACK PAIN: 1
WOUND: 1
DYSURIA: 0

## 2023-10-13 ASSESSMENT — LIFESTYLE VARIABLES: TOTAL SCORE: 4

## 2023-10-17 LAB
6MAM UR CFM-MCNC: <25 NG/ML
CODEINE UR CFM-MCNC: <50 NG/ML
HYDROCODONE CTO UR CFM-MCNC: <25 NG/ML
HYDROMORPHONE UR CFM-MCNC: <25 NG/ML
MORPHINE UR CFM-MCNC: <50 NG/ML
NORHYDROCODONE UR CFM-MCNC: <25 NG/ML
NOROXYCODONE UR CFM-MCNC: 143 NG/ML
OXYCODONE UR CFM-MCNC: 294 NG/ML
OXYMORPHONE UR CFM-MCNC: 93 NG/ML

## 2023-11-27 DIAGNOSIS — E55.9 VITAMIN D DEFICIENCY, UNSPECIFIED: ICD-10-CM

## 2023-11-27 DIAGNOSIS — M54.41 LUMBAGO WITH SCIATICA, RIGHT SIDE: ICD-10-CM

## 2023-11-27 DIAGNOSIS — G89.29 OTHER CHRONIC PAIN: ICD-10-CM

## 2023-11-27 RX ORDER — ERGOCALCIFEROL 1.25 MG/1
1 CAPSULE ORAL
Qty: 4 CAPSULE | Refills: 3 | Status: SHIPPED | OUTPATIENT
Start: 2023-11-27 | End: 2024-03-27

## 2023-11-27 RX ORDER — IBUPROFEN 800 MG/1
TABLET ORAL
Qty: 90 TABLET | Refills: 3 | Status: SHIPPED | OUTPATIENT
Start: 2023-11-27 | End: 2024-03-27

## 2024-01-10 ENCOUNTER — OFFICE VISIT (OUTPATIENT)
Dept: PRIMARY CARE | Facility: CLINIC | Age: 54
End: 2024-01-10
Payer: COMMERCIAL

## 2024-01-10 VITALS
SYSTOLIC BLOOD PRESSURE: 146 MMHG | HEART RATE: 82 BPM | HEIGHT: 73 IN | TEMPERATURE: 97.5 F | DIASTOLIC BLOOD PRESSURE: 79 MMHG | WEIGHT: 315 LBS | OXYGEN SATURATION: 95 % | BODY MASS INDEX: 41.75 KG/M2

## 2024-01-10 DIAGNOSIS — M54.41 CHRONIC BILATERAL LOW BACK PAIN WITH RIGHT-SIDED SCIATICA: ICD-10-CM

## 2024-01-10 DIAGNOSIS — I48.0 PAROXYSMAL ATRIAL FIBRILLATION (MULTI): ICD-10-CM

## 2024-01-10 DIAGNOSIS — F32.A DEPRESSION, UNSPECIFIED DEPRESSION TYPE: ICD-10-CM

## 2024-01-10 DIAGNOSIS — G89.29 CHRONIC BILATERAL LOW BACK PAIN WITH RIGHT-SIDED SCIATICA: ICD-10-CM

## 2024-01-10 DIAGNOSIS — F40.01 AGORAPHOBIA WITH PANIC ATTACKS: Primary | ICD-10-CM

## 2024-01-10 DIAGNOSIS — E11.21 DIABETIC NEPHROPATHY ASSOCIATED WITH TYPE 2 DIABETES MELLITUS (MULTI): ICD-10-CM

## 2024-01-10 DIAGNOSIS — E11.29 TYPE 2 DIABETES MELLITUS WITH OTHER DIABETIC KIDNEY COMPLICATION (MULTI): ICD-10-CM

## 2024-01-10 DIAGNOSIS — J44.9 CHRONIC OBSTRUCTIVE PULMONARY DISEASE, UNSPECIFIED COPD TYPE (MULTI): ICD-10-CM

## 2024-01-10 PROBLEM — E03.9 ACQUIRED HYPOTHYROIDISM: Status: ACTIVE | Noted: 2019-06-26

## 2024-01-10 PROBLEM — L30.9 ECZEMA: Status: ACTIVE | Noted: 2024-01-10

## 2024-01-10 PROBLEM — E78.2 MIXED HYPERLIPIDEMIA: Status: RESOLVED | Noted: 2023-09-07 | Resolved: 2024-01-10

## 2024-01-10 PROBLEM — K57.90 DIVERTICULOSIS: Status: ACTIVE | Noted: 2024-01-10

## 2024-01-10 PROBLEM — R73.01 IMPAIRED FASTING GLUCOSE: Status: RESOLVED | Noted: 2024-01-10 | Resolved: 2024-01-10

## 2024-01-10 PROBLEM — E29.1 HYPOGONADISM IN MALE: Status: ACTIVE | Noted: 2017-02-17

## 2024-01-10 PROBLEM — R73.01 IMPAIRED FASTING GLUCOSE: Status: ACTIVE | Noted: 2024-01-10

## 2024-01-10 PROCEDURE — 3077F SYST BP >= 140 MM HG: CPT | Performed by: INTERNAL MEDICINE

## 2024-01-10 PROCEDURE — 4010F ACE/ARB THERAPY RXD/TAKEN: CPT | Performed by: INTERNAL MEDICINE

## 2024-01-10 PROCEDURE — 3078F DIAST BP <80 MM HG: CPT | Performed by: INTERNAL MEDICINE

## 2024-01-10 PROCEDURE — 99214 OFFICE O/P EST MOD 30 MIN: CPT | Performed by: INTERNAL MEDICINE

## 2024-01-10 PROCEDURE — 3066F NEPHROPATHY DOC TX: CPT | Performed by: INTERNAL MEDICINE

## 2024-01-10 PROCEDURE — 1036F TOBACCO NON-USER: CPT | Performed by: INTERNAL MEDICINE

## 2024-01-10 RX ORDER — DULOXETIN HYDROCHLORIDE 30 MG/1
30 CAPSULE, DELAYED RELEASE ORAL DAILY
Qty: 90 CAPSULE | Refills: 0 | Status: SHIPPED | OUTPATIENT
Start: 2024-01-10 | End: 2024-04-01 | Stop reason: SDUPTHER

## 2024-01-10 ASSESSMENT — PAIN SCALES - GENERAL: PAINLEVEL: 2

## 2024-01-10 NOTE — PROGRESS NOTES
Subjective   Patient ID: Jose Eduardo Diaz is a 53 y.o. male who presents for Follow-up (3 month follow up/).    HPI     History reviewed and updated.  Follows regularly with his endocrinologist.  Health is generally stable.  The Percocet helps a lot with his joint pain so that he can be more active.  Overall Jose Eduardo would like to cut back on the number of medications that he takes.  He would like to reduce the dose of duloxetine.    OARRS:  Sharron Kearney MD on 1/12/2024  5:15 PM  I have personally reviewed the OARRS report for Jose Eduardo Diaz. I have considered the risks of abuse, dependence, addiction and diversion and I believe that it is clinically appropriate for Jose Eduardo Diaz to be prescribed this medication    Is the patient prescribed a combination of a benzodiazepine and opioid?  No    Last Urine Drug Screen / ordered today: No  Recent Results (from the past 8760 hour(s))   Opiate Confirmation, Urine    Collection Time: 10/12/23 11:28 AM   Result Value Ref Range    6-Acetylmorphine <25 <25 ng/mL    Codeine <50 <50 ng/mL    Hydrocodone <25 <25 ng/mL    Hydromorphone <25 <25 ng/mL    Morphine  <50 <50 ng/mL    Norhydrocodone <25 <25 ng/mL    Noroxycodone 143 (H) <25 ng/mL    Oxycodone 294 (H) <25 ng/mL    Oxymorphone 93 (H) <25 ng/mL   Drug Screen, Urine With Reflex to Confirmation    Collection Time: 10/12/23 11:28 AM   Result Value Ref Range    Amphetamine Screen, Urine Presumptive Negative Presumptive Negative    Barbiturate Screen, Urine Presumptive Negative Presumptive Negative    Benzodiazepines Screen, Urine Presumptive Negative Presumptive Negative    Cannabinoid Screen, Urine Presumptive Negative Presumptive Negative    Cocaine Metabolite Screen, Urine Presumptive Negative Presumptive Negative    Fentanyl Screen, Urine Presumptive Negative Presumptive Negative    Opiate Screen, Urine Presumptive Negative Presumptive Negative    Oxycodone Screen, Urine Presumptive Positive (A) Presumptive Negative    PCP  Screen, Urine Presumptive Negative Presumptive Negative     Results are as expected.         Controlled Substance Agreement:   Date of the Last Agreement: 10/12/23.  Reviewed Controlled Substance Agreement including but not limited to the benefits, risks, and alternatives to treatment with a Controlled Substance medication(s).    Opioids:  What is the patient's goal of therapy? Joint pain relief  Is this being achieved with current treatment? yes    I have calculated the patient's Morphine Dose Equivalent (MED):   22.5  I have considered referral to Pain Management and/or a specialist, and do not feel it is necessary at this time.    I feel that it is clinically indicated to continue this current medication regimen after consideration of alternative therapies, and other non-opioid treatment.    Opioid Risk Screening:  Family History of Substance Abuse  Alcohol: 0 (10/13/2023  7:00 AM)  Illegal Drugs: 3 (10/13/2023  7:00 AM)  Prescription Drugs: 0 (10/13/2023  7:00 AM)    Personal History of Substance Abuse  Alcohol: 0 (10/13/2023  7:00 AM)  Illegal Drugs: 0 (10/13/2023  7:00 AM)  Prescription Drugs: 0 (10/13/2023  7:00 AM)    Patient Age (16-45)  Age (16-45): 0 (10/13/2023  7:00 AM)    History of Preadolescent Sexual Abuse  History of Preadolescent Sexual Abuse: .0 (10/13/2023  7:00 AM)    Psychological Disease  Attention Deficit Disorder, Obsessive Compulsive Disorder, Bipolar, Schizophrenia: 0 (10/13/2023  7:00 AM)  Depression: 1 (10/13/2023  7:00 AM)    Total Score  Total Score: 4 (10/13/2023  7:00 AM)    Total Score Risk Category  TOTAL SCORE CATEGORY: Moderate Risk (4-7) (10/13/2023  7:00 AM)        Pain Assessment:  Analgesia  What was your pain level on average during the past week?: 6  What was your pain level at its worst during the past week?: 8  What percentage of your pain has been relieved during the past week?: 85 %  Is the amount of pain relief you are now obtaining from your current pain relievers  "enough to make a real difference in your life?: Y  Query to Clinician: Is the patient's pain relief clinically significant?: Yes    Activities of Daily Living  Physical Functioning: Better  Family Relationships: Better  Social Relationships: Better  Mood: Better  Sleep Patterns: Better  Overall Functioning: Better    Adverse Events  Is patient experiencing any side effects from current pain relievers?: N      Assessment  Is your overall impression that this patient is benefiting from opioid therapy?: Yes  Specific Analgesic Plan: Continue present regimen        Review of Systems    Objective   /79   Pulse 82   Temp 36.4 °C (97.5 °F) (Temporal)   Ht 1.854 m (6' 1\")   Wt 149 kg (328 lb)   SpO2 95%   BMI 43.27 kg/m²     Physical Exam    Assessment/Plan   Problem List Items Addressed This Visit             ICD-10-CM    Agoraphobia with panic attacks - Primary F40.01    Relevant Medications    DULoxetine (Cymbalta) 30 mg DR capsule    COPD (chronic obstructive pulmonary disease) (CMS/[facility]) J44.9    Depression F32.A    Relevant Medications    DULoxetine (Cymbalta) 30 mg DR capsule    Paroxysmal atrial fibrillation (CMS/[facility]) I48.0    Type 2 diabetes mellitus with other diabetic kidney complication (CMS/[facility]) E11.29    Chronic bilateral low back pain with right-sided sciatica M54.41, G89.29    Relevant Medications    oxyCODONE-acetaminophen (Percocet) 5-325 mg tablet (Start on 2/1/2024)    oxyCODONE-acetaminophen (Percocet) 5-325 mg tablet (Start on 3/2/2024)    oxyCODONE-acetaminophen (Percocet) 5-325 mg tablet (Start on 4/1/2024)    Diabetic nephropathy associated with type 2 diabetes mellitus (CMS/[facility]) E11.21       Chronic low back pain with right sciatica, lumbar degenerative disc disease, chronic opiate use (Percocet) - has tried pain management injections in the past without improvement. Pain is currently well controlled with Percocet, approximately 3 tabs per day. Opioid risk score is 4 (3 points for family " history of substance abuse ) - Jose Eduardo's pain is consistent with his disc disease and he gets adequate relief from his medication. I do not believe he poses a high risk for misuse of medication. The Controlled Substance Agreement is up to date from 10/12/2023. OARRS reviewed. Urine drug screen 10/12/2023. Narcan 9/7/23. Percocet refilled.     Depression, Agoraphobia with panic attacks - reduce duloxetine to 30 mg daily per patient request.     Hypertension - borderline control, continue current medication and healthy habits.     Mixed hyperlipidemia & hypertriglyceridemia - continue statin, fenofibrate.     DM2 with microalbuminuria, nephropathy, uncontrolled - following with endocrinologist at [facility].      Hypothyroidism - following with endocrinologist at [facility].     COPD - no active issues, working on smoking cessation (patch & lozenge).     Patient permanently declines flu shot. Declines COVID vaccine.     Follow-up in 90 days and as needed.

## 2024-01-12 RX ORDER — OXYCODONE AND ACETAMINOPHEN 5; 325 MG/1; MG/1
1 TABLET ORAL 3 TIMES DAILY PRN
Qty: 90 TABLET | Refills: 0 | Status: SHIPPED | OUTPATIENT
Start: 2024-03-02

## 2024-01-12 RX ORDER — OXYCODONE AND ACETAMINOPHEN 5; 325 MG/1; MG/1
1 TABLET ORAL 3 TIMES DAILY PRN
Qty: 90 TABLET | Refills: 0 | Status: SHIPPED | OUTPATIENT
Start: 2024-04-01

## 2024-01-12 RX ORDER — OXYCODONE AND ACETAMINOPHEN 5; 325 MG/1; MG/1
1 TABLET ORAL 3 TIMES DAILY PRN
Qty: 90 TABLET | Refills: 0 | Status: SHIPPED | OUTPATIENT
Start: 2024-02-01 | End: 2024-04-01 | Stop reason: SDUPTHER

## 2024-01-23 DIAGNOSIS — F32.A DEPRESSION, UNSPECIFIED: ICD-10-CM

## 2024-01-23 DIAGNOSIS — F40.01 AGORAPHOBIA WITH PANIC ATTACKS: ICD-10-CM

## 2024-01-23 DIAGNOSIS — I10 ESSENTIAL (PRIMARY) HYPERTENSION: ICD-10-CM

## 2024-01-23 DIAGNOSIS — F32.A DEPRESSION, UNSPECIFIED DEPRESSION TYPE: ICD-10-CM

## 2024-01-24 RX ORDER — AMLODIPINE BESYLATE 10 MG/1
10 TABLET ORAL DAILY
Qty: 90 TABLET | Refills: 3 | Status: SHIPPED | OUTPATIENT
Start: 2024-01-24

## 2024-01-24 RX ORDER — DULOXETIN HYDROCHLORIDE 30 MG/1
30 CAPSULE, DELAYED RELEASE ORAL DAILY
Qty: 90 CAPSULE | Refills: 3 | OUTPATIENT
Start: 2024-01-24

## 2024-01-24 RX ORDER — DULOXETIN HYDROCHLORIDE 60 MG/1
60 CAPSULE, DELAYED RELEASE ORAL DAILY
Qty: 90 CAPSULE | Refills: 3 | OUTPATIENT
Start: 2024-01-24

## 2024-01-26 DIAGNOSIS — F32.A DEPRESSION, UNSPECIFIED: ICD-10-CM

## 2024-01-26 DIAGNOSIS — K21.9 GASTRO-ESOPHAGEAL REFLUX DISEASE WITHOUT ESOPHAGITIS: ICD-10-CM

## 2024-01-26 RX ORDER — FAMOTIDINE 40 MG/1
40 TABLET, FILM COATED ORAL DAILY
Qty: 90 TABLET | Refills: 3 | Status: SHIPPED | OUTPATIENT
Start: 2024-01-26 | End: 2024-04-01 | Stop reason: WASHOUT

## 2024-01-26 RX ORDER — DULOXETIN HYDROCHLORIDE 60 MG/1
60 CAPSULE, DELAYED RELEASE ORAL DAILY
Qty: 90 CAPSULE | Refills: 3 | OUTPATIENT
Start: 2024-01-26

## 2024-02-24 DIAGNOSIS — I10 ESSENTIAL (PRIMARY) HYPERTENSION: ICD-10-CM

## 2024-02-26 RX ORDER — LISINOPRIL 40 MG/1
40 TABLET ORAL DAILY
Qty: 90 TABLET | Refills: 3 | Status: SHIPPED | OUTPATIENT
Start: 2024-02-26

## 2024-03-26 DIAGNOSIS — E55.9 VITAMIN D DEFICIENCY, UNSPECIFIED: ICD-10-CM

## 2024-03-26 DIAGNOSIS — M54.41 LUMBAGO WITH SCIATICA, RIGHT SIDE: ICD-10-CM

## 2024-03-26 DIAGNOSIS — I48.91 UNSPECIFIED ATRIAL FIBRILLATION (MULTI): ICD-10-CM

## 2024-03-26 DIAGNOSIS — G89.29 OTHER CHRONIC PAIN: ICD-10-CM

## 2024-03-27 RX ORDER — APIXABAN 5 MG/1
5 TABLET, FILM COATED ORAL 2 TIMES DAILY
Qty: 180 TABLET | Refills: 3 | Status: SHIPPED | OUTPATIENT
Start: 2024-03-27 | End: 2024-05-13 | Stop reason: SDUPTHER

## 2024-03-27 RX ORDER — IBUPROFEN 800 MG/1
TABLET ORAL
Qty: 90 TABLET | Refills: 3 | Status: SHIPPED | OUTPATIENT
Start: 2024-03-27

## 2024-03-27 RX ORDER — ERGOCALCIFEROL 1.25 MG/1
1 CAPSULE ORAL
Qty: 4 CAPSULE | Refills: 3 | Status: SHIPPED | OUTPATIENT
Start: 2024-03-27

## 2024-04-01 ENCOUNTER — TELEPHONE (OUTPATIENT)
Dept: PRIMARY CARE | Facility: CLINIC | Age: 54
End: 2024-04-01

## 2024-04-01 ENCOUNTER — OFFICE VISIT (OUTPATIENT)
Dept: PRIMARY CARE | Facility: CLINIC | Age: 54
End: 2024-04-01
Payer: COMMERCIAL

## 2024-04-01 VITALS
BODY MASS INDEX: 41.75 KG/M2 | OXYGEN SATURATION: 95 % | DIASTOLIC BLOOD PRESSURE: 73 MMHG | HEART RATE: 76 BPM | WEIGHT: 315 LBS | SYSTOLIC BLOOD PRESSURE: 146 MMHG | HEIGHT: 73 IN | TEMPERATURE: 97.7 F

## 2024-04-01 DIAGNOSIS — E78.1 HYPERTRIGLYCERIDEMIA: ICD-10-CM

## 2024-04-01 DIAGNOSIS — F40.01 AGORAPHOBIA WITH PANIC ATTACKS: ICD-10-CM

## 2024-04-01 DIAGNOSIS — E78.2 MIXED HYPERLIPIDEMIA: ICD-10-CM

## 2024-04-01 DIAGNOSIS — M54.41 CHRONIC BILATERAL LOW BACK PAIN WITH RIGHT-SIDED SCIATICA: Primary | ICD-10-CM

## 2024-04-01 DIAGNOSIS — F32.A DEPRESSION, UNSPECIFIED DEPRESSION TYPE: ICD-10-CM

## 2024-04-01 DIAGNOSIS — Z79.891 LONG TERM (CURRENT) USE OF OPIATE ANALGESIC: ICD-10-CM

## 2024-04-01 DIAGNOSIS — I48.0 PAROXYSMAL ATRIAL FIBRILLATION (MULTI): ICD-10-CM

## 2024-04-01 DIAGNOSIS — G89.29 CHRONIC BILATERAL LOW BACK PAIN WITH RIGHT-SIDED SCIATICA: Primary | ICD-10-CM

## 2024-04-01 DIAGNOSIS — I10 BENIGN ESSENTIAL HYPERTENSION: Primary | ICD-10-CM

## 2024-04-01 DIAGNOSIS — I10 BENIGN ESSENTIAL HYPERTENSION: ICD-10-CM

## 2024-04-01 PROCEDURE — 3077F SYST BP >= 140 MM HG: CPT | Performed by: INTERNAL MEDICINE

## 2024-04-01 PROCEDURE — 3078F DIAST BP <80 MM HG: CPT | Performed by: INTERNAL MEDICINE

## 2024-04-01 PROCEDURE — 4010F ACE/ARB THERAPY RXD/TAKEN: CPT | Performed by: INTERNAL MEDICINE

## 2024-04-01 PROCEDURE — 99214 OFFICE O/P EST MOD 30 MIN: CPT | Performed by: INTERNAL MEDICINE

## 2024-04-01 RX ORDER — OXYCODONE AND ACETAMINOPHEN 5; 325 MG/1; MG/1
1 TABLET ORAL 3 TIMES DAILY PRN
Qty: 90 TABLET | Refills: 0 | Status: SHIPPED | OUTPATIENT
Start: 2024-04-01

## 2024-04-01 RX ORDER — SEMAGLUTIDE 1.34 MG/ML
INJECTION, SOLUTION SUBCUTANEOUS
COMMUNITY

## 2024-04-01 RX ORDER — EMPAGLIFLOZIN 25 MG/1
25 TABLET, FILM COATED ORAL
COMMUNITY
Start: 2024-01-24

## 2024-04-01 RX ORDER — DULOXETIN HYDROCHLORIDE 30 MG/1
30 CAPSULE, DELAYED RELEASE ORAL DAILY
Qty: 90 CAPSULE | Refills: 3 | Status: SHIPPED | OUTPATIENT
Start: 2024-04-01 | End: 2025-03-27

## 2024-04-01 RX ORDER — ROSUVASTATIN CALCIUM 20 MG/1
20 TABLET, COATED ORAL DAILY
COMMUNITY
Start: 2024-01-24

## 2024-04-01 RX ORDER — METOPROLOL SUCCINATE 50 MG/1
150 TABLET, EXTENDED RELEASE ORAL 2 TIMES DAILY
Qty: 540 TABLET | Refills: 3 | Status: SHIPPED | OUTPATIENT
Start: 2024-04-01 | End: 2025-04-01

## 2024-04-01 RX ORDER — OXYCODONE AND ACETAMINOPHEN 5; 325 MG/1; MG/1
1 TABLET ORAL 3 TIMES DAILY PRN
Qty: 90 TABLET | Refills: 0 | Status: SHIPPED | OUTPATIENT
Start: 2024-05-01

## 2024-04-01 RX ORDER — OXYCODONE AND ACETAMINOPHEN 5; 325 MG/1; MG/1
1 TABLET ORAL 3 TIMES DAILY PRN
Qty: 90 TABLET | Refills: 0 | Status: SHIPPED | OUTPATIENT
Start: 2024-05-31

## 2024-04-01 ASSESSMENT — ENCOUNTER SYMPTOMS
FATIGUE: 0
CONSTIPATION: 0
FEVER: 0
DIZZINESS: 0
COUGH: 0
PALPITATIONS: 0
WHEEZING: 0
HEMATURIA: 0
CHILLS: 0
ARTHRALGIAS: 1
LIGHT-HEADEDNESS: 0
UNEXPECTED WEIGHT CHANGE: 1
NAUSEA: 0
DIARRHEA: 0
BACK PAIN: 1
ABDOMINAL PAIN: 0
DYSURIA: 0
SHORTNESS OF BREATH: 0

## 2024-04-01 ASSESSMENT — PATIENT HEALTH QUESTIONNAIRE - PHQ9
2. FEELING DOWN, DEPRESSED OR HOPELESS: NOT AT ALL
1. LITTLE INTEREST OR PLEASURE IN DOING THINGS: NOT AT ALL
SUM OF ALL RESPONSES TO PHQ9 QUESTIONS 1 AND 2: 0

## 2024-04-01 NOTE — PROGRESS NOTES
CHIEF COMPLAINT  Pain    HISTORY OF PRESENT ILLNESS  Jose Eduardo Diaz is a 53 y.o. male presents today for follow up of Pain    HPI    History reviewed and updated.  Needs refill today on Percocet for chronic back pain, chronic joint pain.    He is following more closely with his endocrinologist now.    He is struggling with always feeling hungry / insatiable appetite.  He is trying to eat healthier foods, avoiding fast food.    OARRS:  Sharron Kearney MD on 4/1/2024  2:32 PM  I have personally reviewed the OARRS report for Jose Eduardo Diaz. I have considered the risks of abuse, dependence, addiction and diversion and I believe that it is clinically appropriate for Jose Eduardo Diaz to be prescribed this medication    Is the patient prescribed a combination of a benzodiazepine and opioid?  No    Last Urine Drug Screen / ordered today: No  Recent Results (from the past 8760 hour(s))   Opiate Confirmation, Urine    Collection Time: 10/12/23 11:28 AM   Result Value Ref Range    6-Acetylmorphine <25 <25 ng/mL    Codeine <50 <50 ng/mL    Hydrocodone <25 <25 ng/mL    Hydromorphone <25 <25 ng/mL    Morphine  <50 <50 ng/mL    Norhydrocodone <25 <25 ng/mL    Noroxycodone 143 (H) <25 ng/mL    Oxycodone 294 (H) <25 ng/mL    Oxymorphone 93 (H) <25 ng/mL   Drug Screen, Urine With Reflex to Confirmation    Collection Time: 10/12/23 11:28 AM   Result Value Ref Range    Amphetamine Screen, Urine Presumptive Negative Presumptive Negative    Barbiturate Screen, Urine Presumptive Negative Presumptive Negative    Benzodiazepines Screen, Urine Presumptive Negative Presumptive Negative    Cannabinoid Screen, Urine Presumptive Negative Presumptive Negative    Cocaine Metabolite Screen, Urine Presumptive Negative Presumptive Negative    Fentanyl Screen, Urine Presumptive Negative Presumptive Negative    Opiate Screen, Urine Presumptive Negative Presumptive Negative    Oxycodone Screen, Urine Presumptive Positive (A) Presumptive Negative    PCP  Screen, Urine Presumptive Negative Presumptive Negative     Results are as expected.         Controlled Substance Agreement:  Date of the Last Agreement: 10/12/2023  Reviewed Controlled Substance Agreement including but not limited to the benefits, risks, and alternatives to treatment with a Controlled Substance medication(s).    Opioids:  What is the patient's goal of therapy? Relief of chronic back pain   Is this being achieved with current treatment? yes    I have calculated the patient's Morphine Dose Equivalent (MED):  22.5  I have considered referral to Pain Management and/or a specialist, and do not feel it is necessary at this time.    I feel that it is clinically indicated to continue this current medication regimen after consideration of alternative therapies, and other non-opioid treatment.    Opioid Risk Screening:  Family History of Substance Abuse  Alcohol: 0 (10/13/2023  7:00 AM)  Illegal Drugs: 3 (10/13/2023  7:00 AM)  Prescription Drugs: 0 (10/13/2023  7:00 AM)    Personal History of Substance Abuse  Alcohol: 0 (10/13/2023  7:00 AM)  Illegal Drugs: 0 (10/13/2023  7:00 AM)  Prescription Drugs: 0 (10/13/2023  7:00 AM)    Patient Age (16-45)  Age (16-45): 0 (10/13/2023  7:00 AM)    History of Preadolescent Sexual Abuse  History of Preadolescent Sexual Abuse: .0 (10/13/2023  7:00 AM)    Psychological Disease  Attention Deficit Disorder, Obsessive Compulsive Disorder, Bipolar, Schizophrenia: 0 (10/13/2023  7:00 AM)  Depression: 1 (10/13/2023  7:00 AM)    Total Score  Total Score: 4 (10/13/2023  7:00 AM)    Total Score Risk Category  TOTAL SCORE CATEGORY: Moderate Risk (4-7) (10/13/2023  7:00 AM)        Pain Assessment:  Analgesia  What was your pain level on average during the past week?: 2  What was your pain level at its worst during the past week?: 4  What percentage of your pain has been relieved during the past week?: 85 %  Is the amount of pain relief you are now obtaining from your current pain  relievers enough to make a real difference in your life?: Y  Query to Clinician: Is the patient's pain relief clinically significant?: Yes    Activities of Daily Living  Physical Functioning: Better  Family Relationships: Better  Social Relationships: Better  Mood: Better  Sleep Patterns: Better  Overall Functioning: Better    Adverse Events  Is patient experiencing any side effects from current pain relievers?: Y  Drowsiness: Mild  Patient's Overall Severity of Side Effects: Mild      Assessment  Is your overall impression that this patient is benefiting from opioid therapy?: Yes  Specific Analgesic Plan: Continue present regimen        REVIEW OF SYSTEMS  Review of Systems   Constitutional:  Positive for unexpected weight change. Negative for chills, fatigue and fever.   Respiratory:  Negative for cough, shortness of breath and wheezing.    Cardiovascular:  Negative for chest pain, palpitations and leg swelling.   Gastrointestinal:  Negative for abdominal pain, constipation, diarrhea and nausea.   Genitourinary:  Negative for dysuria and hematuria.   Musculoskeletal:  Positive for arthralgias and back pain.   Neurological:  Negative for dizziness and light-headedness.       ALLERGIES  Penicillins    MEDICATIONS  Current Outpatient Medications   Medication Instructions    albuterol 2.5 mg, inhalation,  EVERY 4-6 HOURS AS NEEDED FOR WHEEZING .<BR>    amLODIPine (NORVASC) 10 mg, oral, Daily    colchicine, gout, 0.6 mg tablet oral, Daily, Take 2 tabs at the first sign of flare, followed in 1 hour with a single dose of .06 mg.  Continue 1 tablet twice daily until flare resolves     dextromethorphan-guaifenesin (MUCINEX DM) 60-1,200 mg tablet extended release 12 hr 1 tablet, oral, Every 12 hours, AS NEEDED FOR CONGESTION.    docosahexaenoic acid/epa (FISH OIL ORAL) Take as directed <BR>    DULoxetine (CYMBALTA) 30 mg, oral, Daily    Eliquis 5 mg, oral, 2 times daily    empagliflozin (Jardiance) 10 mg 1 tablet, oral,  Daily    ergocalciferol (VITAMIN D-2) 1,250 mcg, oral, Weekly    esomeprazole (NEXIUM) 40 mg, oral, Daily, Do not open capsule.    fenofibrate (Tricor) 145 mg tablet 1 tablet, oral, Daily    fluticasone (Flonase) 50 mcg/actuation nasal spray 1 spray, Each Nostril, 2 times daily, Shake gently. Before first use, prime pump. After use, clean tip and replace cap.    glipiZIDE (Glucotrol) 5 mg tablet 2 tablets, oral, 2 times daily before meals    ibuprofen 800 mg tablet TAKE 1 TABLET BY MOUTH THREE TIMES DAILY AS NEEDED FOR 30 DAYS.    insulin glargine (LANTUS) 30 Units, subcutaneous, Daily RT    Jardiance 25 mg, oral, Daily with breakfast    lancets (OneTouch UltraSoft Lancets) misc miscellaneous, Check blood glucose after fasting once daily     levothyroxine (SYNTHROID, LEVOXYL) 50 mcg, oral, Daily    lisinopril 40 mg, oral, Daily    metoprolol succinate XL (Toprol-XL) 50 mg 24 hr tablet 3 tablets, oral, Every morning, AND TAKE 3 TABLETS BY MOUTH EVERY NIGHT.<BR>    multivitamin with minerals (Adults Multivitamin) tablet 1 tablet, oral, Daily    naloxone (NARCAN) 4 mg, nasal, As needed    nicotine (Nicoderm CQ) 21 mg/24 hr patch 1 patch, transdermal, Daily, As directed     nicotine polacrilex (COMMIT) 4 mg, Mouth/Throat, Every 2 hour PRN, Max 5 lozenges every 6 hours; 20 lozenges / day - to increase chances of quitting use at least 9 lozenge/day     omega-3 acid ethyl esters (Lovaza) 1 gram capsule 2 capsules, oral, 2 times daily    oxyCODONE-acetaminophen (Percocet) 5-325 mg tablet 1 tablet, oral, 3 times daily PRN    oxyCODONE-acetaminophen (Percocet) 5-325 mg tablet 1 tablet, oral, 3 times daily PRN    oxyCODONE-acetaminophen (Percocet) 5-325 mg tablet 1 tablet, oral, 3 times daily PRN    [START ON 5/1/2024] oxyCODONE-acetaminophen (Percocet) 5-325 mg tablet 1 tablet, oral, 3 times daily PRN    [START ON 5/31/2024] oxyCODONE-acetaminophen (Percocet) 5-325 mg tablet 1 tablet, oral, 3 times daily PRN    Ozempic 1  "mg/dose (4 mg/3 mL) pen injector Inject 1 mg subcutaneously one time a week.    rosuvastatin (Crestor) 10 mg tablet 1 tablet, oral, Nightly    rosuvastatin (CRESTOR) 20 mg, oral, Daily    triamcinolone (Kenalog) 0.1 % cream  APPLY A THIN LAYER TO AFFECTED AREA(S) TWICE DAILY. AVOID FACE, GENITALIA    ZINC ORAL Unsure of dose<BR>       TOBACCO USE  Social History     Tobacco Use   Smoking Status Every Day    Packs/day: 1.50    Years: 20.00    Additional pack years: 0.00    Total pack years: 30.00    Types: Cigarettes   Smokeless Tobacco Never       DEPRESSION SCREEN  Over the past 2 weeks, how often have you been bothered by any of the following problems?  Little interest or pleasure in doing things: Not at all  Feeling down, depressed, or hopeless: Not at all    SURGICAL HISTORY  No past surgical history on file.       OBJECTIVE    /73   Pulse 76   Temp 36.5 °C (97.7 °F)   Ht 1.854 m (6' 1\")   Wt 150 kg (330 lb 4.8 oz)   SpO2 95%   BMI 43.58 kg/m²    BMI: Estimated body mass index is 43.58 kg/m² as calculated from the following:    Height as of this encounter: 1.854 m (6' 1\").    Weight as of this encounter: 150 kg (330 lb 4.8 oz).    BP Readings from Last 3 Encounters:   04/01/24 146/73   01/10/24 146/79   10/12/23 134/77      Wt Readings from Last 3 Encounters:   04/01/24 150 kg (330 lb 4.8 oz)   01/10/24 149 kg (328 lb)   10/12/23 147 kg (324 lb 6.4 oz)        PHYSICAL EXAM  Physical Exam  Constitutional:       Appearance: Normal appearance. He is obese.   HENT:      Head: Normocephalic and atraumatic.   Cardiovascular:      Rate and Rhythm: Normal rate and regular rhythm.      Heart sounds: No murmur heard.  Pulmonary:      Effort: Pulmonary effort is normal. No respiratory distress.      Breath sounds: Normal breath sounds. No wheezing.   Musculoskeletal:         General: No swelling.      Right lower leg: No edema.      Left lower leg: No edema.   Skin:     Findings: No rash.   Neurological:      " General: No focal deficit present.      Mental Status: He is alert and oriented to person, place, and time. Mental status is at baseline.   Psychiatric:         Mood and Affect: Mood normal.         Behavior: Behavior normal.         Thought Content: Thought content normal.         Judgment: Judgment normal.          ASSESSMENT AND PLAN  Assessment/Plan   Problem List Items Addressed This Visit       Agoraphobia with panic attacks    Relevant Medications    DULoxetine (Cymbalta) 30 mg DR capsule    Benign essential hypertension    Depression    Relevant Medications    DULoxetine (Cymbalta) 30 mg DR capsule    Mixed hyperlipidemia    Long term (current) use of opiate analgesic    Chronic bilateral low back pain with right-sided sciatica - Primary    Relevant Medications    oxyCODONE-acetaminophen (Percocet) 5-325 mg tablet    oxyCODONE-acetaminophen (Percocet) 5-325 mg tablet (Start on 5/1/2024)    oxyCODONE-acetaminophen (Percocet) 5-325 mg tablet (Start on 5/31/2024)    Hypertriglyceridemia       Chronic low back pain with right sciatica, lumbar degenerative disc disease, chronic opiate use (Percocet) - has tried pain management injections in the past without improvement. Pain is currently well controlled with Percocet, approximately 3 tabs per day. Opioid risk score is 4 (3 points for family history of substance abuse ) - Jose Eduardo's pain is consistent with his disc disease and he gets adequate relief from his medication. I do not believe he poses a high risk for misuse of medication. The Controlled Substance Agreement is up to date from 10/12/2023. OARRS reviewed. Urine drug screen 10/12/2023. Narcan 9/7/23. Percocet refilled.      Depression, Agoraphobia with panic attacks - doing well on duloxetine to 30 mg daily, refill sent.     Hypertension - borderline control, continue current medication and healthy habits.  He is actively trying to lose weight, which will help blood pressure also.      Mixed hyperlipidemia &  hypertriglyceridemia - continue statin, fenofibrate.     DM2 with microalbuminuria, nephropathy, uncontrolled - following with endocrinologist at [facility].      Hypothyroidism - following with endocrinologist at [facility].      COPD - no active issues, working on smoking cessation (patch & lozenge).     Patient permanently declines flu shot. Declines COVID vaccine.     Follow-up in 90 days and as needed.

## 2024-04-01 NOTE — TELEPHONE ENCOUNTER
Pt on the way out asked for his metoprolol to be sent in       [facility] [id number] - [place], [place] - [address] [address]  [address]  [facility] [address]  Phone: [phone] Fax: [phone]

## 2024-04-08 ENCOUNTER — APPOINTMENT (OUTPATIENT)
Dept: PRIMARY CARE | Facility: CLINIC | Age: 54
End: 2024-04-08
Payer: COMMERCIAL

## 2024-04-10 ENCOUNTER — APPOINTMENT (OUTPATIENT)
Dept: PRIMARY CARE | Facility: CLINIC | Age: 54
End: 2024-04-10
Payer: COMMERCIAL

## 2024-04-23 ENCOUNTER — TELEPHONE (OUTPATIENT)
Dept: PRIMARY CARE | Facility: CLINIC | Age: 54
End: 2024-04-23
Payer: COMMERCIAL

## 2024-04-23 DIAGNOSIS — L23.7 POISON IVY DERMATITIS: Primary | ICD-10-CM

## 2024-04-23 RX ORDER — PREDNISONE 20 MG/1
20 TABLET ORAL DAILY
Qty: 3 TABLET | Refills: 0 | Status: SHIPPED | OUTPATIENT
Start: 2024-04-23 | End: 2024-04-26

## 2024-04-24 ENCOUNTER — TELEPHONE (OUTPATIENT)
Dept: PRIMARY CARE | Facility: CLINIC | Age: 54
End: 2024-04-24
Payer: COMMERCIAL

## 2024-04-24 DIAGNOSIS — L23.7 POISON IVY DERMATITIS: Primary | ICD-10-CM

## 2024-04-24 RX ORDER — PREDNISONE 10 MG/1
TABLET ORAL DAILY
Qty: 30 TABLET | Refills: 0 | Status: SHIPPED | OUTPATIENT
Start: 2024-04-24 | End: 2024-05-04

## 2024-04-24 NOTE — TELEPHONE ENCOUNTER
Patient has poison ivy all over. He called on call doctor last night and was given a low dose of prednisone due to him being diabetic.  He says it did not help at all.  He was not able to sleep because the itching was so bad.  He said he did check his blood sugar after taking and it went up 2 points. He hopes you can send in something stronger. He takes an allergy pill daily and its not helping either.     [facility] [id number] - [place], [place] - [address] [address]  [address]  [facility] [address]  Phone: [phone] Fax: [phone]

## 2024-04-25 DIAGNOSIS — E11.29 TYPE 2 DIABETES MELLITUS WITH OTHER DIABETIC KIDNEY COMPLICATION (MULTI): ICD-10-CM

## 2024-04-25 DIAGNOSIS — R80.9 PROTEINURIA, UNSPECIFIED: ICD-10-CM

## 2024-04-25 DIAGNOSIS — K21.9 GASTRO-ESOPHAGEAL REFLUX DISEASE WITHOUT ESOPHAGITIS: ICD-10-CM

## 2024-04-25 RX ORDER — GLIPIZIDE 5 MG/1
10 TABLET ORAL
Qty: 120 TABLET | Refills: 11 | Status: SHIPPED | OUTPATIENT
Start: 2024-04-25

## 2024-04-25 RX ORDER — ESOMEPRAZOLE MAGNESIUM 40 MG/1
40 CAPSULE, DELAYED RELEASE ORAL DAILY
Qty: 90 CAPSULE | Refills: 11 | Status: SHIPPED | OUTPATIENT
Start: 2024-04-25 | End: 2024-05-08 | Stop reason: SDUPTHER

## 2024-05-08 ENCOUNTER — TELEPHONE (OUTPATIENT)
Dept: PRIMARY CARE | Facility: CLINIC | Age: 54
End: 2024-05-08
Payer: COMMERCIAL

## 2024-05-08 DIAGNOSIS — K21.9 GASTRO-ESOPHAGEAL REFLUX DISEASE WITHOUT ESOPHAGITIS: ICD-10-CM

## 2024-05-08 DIAGNOSIS — I48.91 UNSPECIFIED ATRIAL FIBRILLATION (MULTI): ICD-10-CM

## 2024-05-08 RX ORDER — ESOMEPRAZOLE MAGNESIUM 40 MG/1
40 CAPSULE, DELAYED RELEASE ORAL DAILY
Qty: 90 CAPSULE | Refills: 3 | Status: SHIPPED | OUTPATIENT
Start: 2024-05-08 | End: 2025-05-08

## 2024-05-08 NOTE — TELEPHONE ENCOUNTER
Pt called, [facility] told him that the esomeprazole 40mg needs to be sent again.       [facility] [id number] - [place], [place] - [address] [address]  [address]  [facility] [address]  Phone: [phone] Fax: [phone]

## 2024-05-13 ENCOUNTER — TELEPHONE (OUTPATIENT)
Dept: PRIMARY CARE | Facility: CLINIC | Age: 54
End: 2024-05-13
Payer: COMMERCIAL

## 2024-05-13 NOTE — TELEPHONE ENCOUNTER
----- Message from Sharron Kearney MD sent at 5/10/2024  5:39 PM EDT -----  Regarding: esomeprazole  Sharon call patient to find out if he was able to fill his Rx for esomeprazole.    If not, I'll need to submit PA and I'll need a history of why he needs this medication over others (ex. Omeprazole, pantoprazole).

## 2024-07-01 ENCOUNTER — APPOINTMENT (OUTPATIENT)
Dept: PRIMARY CARE | Facility: CLINIC | Age: 54
End: 2024-07-01
Payer: COMMERCIAL

## 2024-07-01 VITALS
SYSTOLIC BLOOD PRESSURE: 147 MMHG | DIASTOLIC BLOOD PRESSURE: 76 MMHG | WEIGHT: 315 LBS | HEART RATE: 56 BPM | TEMPERATURE: 97.4 F | BODY MASS INDEX: 41.75 KG/M2 | HEIGHT: 73 IN | OXYGEN SATURATION: 98 %

## 2024-07-01 DIAGNOSIS — E03.9 ACQUIRED HYPOTHYROIDISM: ICD-10-CM

## 2024-07-01 DIAGNOSIS — G47.33 OBSTRUCTIVE SLEEP APNEA: ICD-10-CM

## 2024-07-01 DIAGNOSIS — M25.551 RIGHT HIP PAIN: ICD-10-CM

## 2024-07-01 DIAGNOSIS — N48.1 BALANITIS: Primary | ICD-10-CM

## 2024-07-01 DIAGNOSIS — Z79.891 LONG TERM (CURRENT) USE OF OPIATE ANALGESIC: ICD-10-CM

## 2024-07-01 DIAGNOSIS — M54.41 CHRONIC BILATERAL LOW BACK PAIN WITH RIGHT-SIDED SCIATICA: ICD-10-CM

## 2024-07-01 DIAGNOSIS — F41.9 ANXIETY: ICD-10-CM

## 2024-07-01 DIAGNOSIS — E11.29 TYPE 2 DIABETES MELLITUS WITH OTHER DIABETIC KIDNEY COMPLICATION (MULTI): ICD-10-CM

## 2024-07-01 DIAGNOSIS — F40.01 AGORAPHOBIA WITH PANIC ATTACKS: ICD-10-CM

## 2024-07-01 DIAGNOSIS — G47.19 EXCESSIVE DAYTIME SLEEPINESS: ICD-10-CM

## 2024-07-01 DIAGNOSIS — I10 BENIGN ESSENTIAL HYPERTENSION: ICD-10-CM

## 2024-07-01 DIAGNOSIS — G89.29 CHRONIC BILATERAL LOW BACK PAIN WITH RIGHT-SIDED SCIATICA: ICD-10-CM

## 2024-07-01 DIAGNOSIS — F33.41 RECURRENT MAJOR DEPRESSIVE DISORDER, IN PARTIAL REMISSION (CMS-HCC): ICD-10-CM

## 2024-07-01 PROCEDURE — 3077F SYST BP >= 140 MM HG: CPT | Performed by: INTERNAL MEDICINE

## 2024-07-01 PROCEDURE — 99214 OFFICE O/P EST MOD 30 MIN: CPT | Performed by: INTERNAL MEDICINE

## 2024-07-01 PROCEDURE — 3078F DIAST BP <80 MM HG: CPT | Performed by: INTERNAL MEDICINE

## 2024-07-01 PROCEDURE — 4010F ACE/ARB THERAPY RXD/TAKEN: CPT | Performed by: INTERNAL MEDICINE

## 2024-07-01 RX ORDER — LANCETS 33 GAUGE
EACH MISCELLANEOUS
COMMUNITY
Start: 2024-05-27

## 2024-07-01 RX ORDER — OXYCODONE AND ACETAMINOPHEN 5; 325 MG/1; MG/1
1 TABLET ORAL 3 TIMES DAILY PRN
Qty: 90 TABLET | Refills: 0 | Status: SHIPPED | OUTPATIENT
Start: 2024-07-31

## 2024-07-01 RX ORDER — OXYCODONE AND ACETAMINOPHEN 5; 325 MG/1; MG/1
1 TABLET ORAL 3 TIMES DAILY PRN
Qty: 90 TABLET | Refills: 0 | Status: SHIPPED | OUTPATIENT
Start: 2024-07-01

## 2024-07-01 RX ORDER — CLOTRIMAZOLE 1 %
CREAM (GRAM) TOPICAL 2 TIMES DAILY
Qty: 60 G | Refills: 0 | Status: SHIPPED | OUTPATIENT
Start: 2024-07-01

## 2024-07-01 RX ORDER — OXYCODONE AND ACETAMINOPHEN 5; 325 MG/1; MG/1
1 TABLET ORAL 3 TIMES DAILY PRN
Qty: 90 TABLET | Refills: 0 | Status: SHIPPED | OUTPATIENT
Start: 2024-08-29

## 2024-07-01 ASSESSMENT — PATIENT HEALTH QUESTIONNAIRE - PHQ9
SUM OF ALL RESPONSES TO PHQ9 QUESTIONS 1 AND 2: 0
2. FEELING DOWN, DEPRESSED OR HOPELESS: NOT AT ALL
1. LITTLE INTEREST OR PLEASURE IN DOING THINGS: NOT AT ALL

## 2024-07-01 ASSESSMENT — ENCOUNTER SYMPTOMS
CONSTIPATION: 0
COUGH: 0
ABDOMINAL PAIN: 0
DIARRHEA: 0
FATIGUE: 1
FEVER: 0
DIZZINESS: 0
LIGHT-HEADEDNESS: 0
NAUSEA: 0
HEMATURIA: 0
SLEEP DISTURBANCE: 1
SHORTNESS OF BREATH: 0
BACK PAIN: 1
PALPITATIONS: 0
ARTHRALGIAS: 1
WHEEZING: 0
NERVOUS/ANXIOUS: 1
DYSPHORIC MOOD: 1
CHILLS: 0
DYSURIA: 0

## 2024-07-01 NOTE — PROGRESS NOTES
CHIEF COMPLAINT  Hypertension and Pain    HISTORY OF PRESENT ILLNESS  Jose Eduardo Diaz is a 54 y.o. male presents today for follow up of Hypertension and Pain    HPI    Has been having recurrent penile yeast infections.   He uses OTC cream - helps somewhat.  He assumed it was due to his Ozempic, so he stopped taking 3 weeks ago.  He is scheduled to see his endocrinologist tomorrow.  States the Ozempic did not help to reduce his appetite.  He's also struggling with smoking - he wants to quit, however he continues to enjoy smoking.   He feels tired all the time - needs nap in afternoon.  Has prior diagnosis of sleep apnea.  Does not wear CPAP.   Has been many years since sleep study.  Needs refill on his oxycodone for chronic pain.     OARRS:  Sharron Kearney MD on 7/1/2024  9:41 AM  I have personally reviewed the OARRS report for Jose Eduardo Diaz. I have considered the risks of abuse, dependence, addiction and diversion and I believe that it is clinically appropriate for Jose Eduardo Diaz to be prescribed this medication    Is the patient prescribed a combination of a benzodiazepine and opioid?  No    Last Urine Drug Screen / ordered today: No  Recent Results (from the past 8760 hour(s))   Opiate Confirmation, Urine    Collection Time: 10/12/23 11:28 AM   Result Value Ref Range    6-Acetylmorphine <25 <25 ng/mL    Codeine <50 <50 ng/mL    Hydrocodone <25 <25 ng/mL    Hydromorphone <25 <25 ng/mL    Morphine  <50 <50 ng/mL    Norhydrocodone <25 <25 ng/mL    Noroxycodone 143 (H) <25 ng/mL    Oxycodone 294 (H) <25 ng/mL    Oxymorphone 93 (H) <25 ng/mL   Drug Screen, Urine With Reflex to Confirmation    Collection Time: 10/12/23 11:28 AM   Result Value Ref Range    Amphetamine Screen, Urine Presumptive Negative Presumptive Negative    Barbiturate Screen, Urine Presumptive Negative Presumptive Negative    Benzodiazepines Screen, Urine Presumptive Negative Presumptive Negative    Cannabinoid Screen, Urine Presumptive Negative  Presumptive Negative    Cocaine Metabolite Screen, Urine Presumptive Negative Presumptive Negative    Fentanyl Screen, Urine Presumptive Negative Presumptive Negative    Opiate Screen, Urine Presumptive Negative Presumptive Negative    Oxycodone Screen, Urine Presumptive Positive (A) Presumptive Negative    PCP Screen, Urine Presumptive Negative Presumptive Negative     Results are as expected.         Controlled Substance Agreement:  Date of the Last Agreement: 10/12/23  Reviewed Controlled Substance Agreement including but not limited to the benefits, risks, and alternatives to treatment with a Controlled Substance medication(s).    Opioids:  What is the patient's goal of therapy? Chronic pain relief  Is this being achieved with current treatment? yes    I have calculated the patient's Morphine Dose Equivalent (MED):  22.5  I have considered referral to Pain Management and/or a specialist, and do not feel it is necessary at this time.    I feel that it is clinically indicated to continue this current medication regimen after consideration of alternative therapies, and other non-opioid treatment.    Opioid Risk Screening:  Family History of Substance Abuse  Alcohol: 0 (10/13/2023  7:00 AM)  Illegal Drugs: 3 (10/13/2023  7:00 AM)  Prescription Drugs: 0 (10/13/2023  7:00 AM)    Personal History of Substance Abuse  Alcohol: 0 (10/13/2023  7:00 AM)  Illegal Drugs: 0 (10/13/2023  7:00 AM)  Prescription Drugs: 0 (10/13/2023  7:00 AM)    Patient Age (16-45)  Age (16-45): 0 (10/13/2023  7:00 AM)    History of Preadolescent Sexual Abuse  History of Preadolescent Sexual Abuse: .0 (10/13/2023  7:00 AM)    Psychological Disease  Attention Deficit Disorder, Obsessive Compulsive Disorder, Bipolar, Schizophrenia: 0 (10/13/2023  7:00 AM)  Depression: 1 (10/13/2023  7:00 AM)    Total Score  Total Score: 4 (10/13/2023  7:00 AM)    Total Score Risk Category  TOTAL SCORE CATEGORY: Moderate Risk (4-7) (10/13/2023  7:00 AM)        Pain  Assessment:  Analgesia  What was your pain level on average during the past week?: 3  What was your pain level at its worst during the past week?: 5  What percentage of your pain has been relieved during the past week?: 100 %  Is the amount of pain relief you are now obtaining from your current pain relievers enough to make a real difference in your life?: Y  Query to Clinician: Is the patient's pain relief clinically significant?: Yes    Activities of Daily Living  Physical Functioning: Better  Family Relationships: Better  Social Relationships: Better  Mood: Better  Sleep Patterns: Better  Overall Functioning: Better    Adverse Events  Is patient experiencing any side effects from current pain relievers?: Y  Drowsiness: Moderate      Assessment  Is your overall impression that this patient is benefiting from opioid therapy?: Yes  Specific Analgesic Plan: Continue present regimen        REVIEW OF SYSTEMS  Review of Systems   Constitutional:  Positive for fatigue. Negative for chills and fever.   Respiratory:  Negative for cough, shortness of breath and wheezing.    Cardiovascular:  Negative for chest pain, palpitations and leg swelling.   Gastrointestinal:  Negative for abdominal pain, constipation, diarrhea and nausea.   Genitourinary:  Negative for dysuria and hematuria.   Musculoskeletal:  Positive for arthralgias and back pain.   Skin:  Positive for rash.   Neurological:  Negative for dizziness and light-headedness.   Psychiatric/Behavioral:  Positive for dysphoric mood and sleep disturbance. The patient is nervous/anxious.        ALLERGIES  Penicillins    MEDICATIONS  Current Outpatient Medications   Medication Instructions    albuterol 2.5 mg, inhalation,  EVERY 4-6 HOURS AS NEEDED FOR WHEEZING .<BR>    amLODIPine (NORVASC) 10 mg, oral, Daily    apixaban (ELIQUIS) 5 mg, oral, 2 times daily    clotrimazole (Lotrimin) 1 % cream Topical, 2 times daily    colchicine, gout, 0.6 mg tablet oral, Daily, Take 2 tabs  at the first sign of flare, followed in 1 hour with a single dose of .06 mg.  Continue 1 tablet twice daily until flare resolves     dextromethorphan-guaifenesin (MUCINEX DM) 60-1,200 mg tablet extended release 12 hr 1 tablet, oral, Every 12 hours, AS NEEDED FOR CONGESTION.    docosahexaenoic acid/epa (FISH OIL ORAL) Take as directed <BR>    DULoxetine (CYMBALTA) 30 mg, oral, Daily    empagliflozin (Jardiance) 10 mg 1 tablet, oral, Daily    ergocalciferol (VITAMIN D-2) 1,250 mcg, oral, Once Weekly    esomeprazole (NEXIUM) 40 mg, oral, Daily    fenofibrate (Tricor) 145 mg tablet 1 tablet, oral, Daily    fluticasone (Flonase) 50 mcg/actuation nasal spray 1 spray, Each Nostril, 2 times daily, Shake gently. Before first use, prime pump. After use, clean tip and replace cap.    glipiZIDE (GLUCOTROL) 10 mg, oral, 2 times daily before meals    ibuprofen 800 mg tablet TAKE 1 TABLET BY MOUTH THREE TIMES DAILY AS NEEDED FOR 30 DAYS.    insulin glargine (LANTUS) 30 Units, subcutaneous, Daily RT    Jardiance 25 mg, oral, Daily with breakfast    lancets (OneTouch UltraSoft Lancets) misc miscellaneous, Check blood glucose after fasting once daily     levothyroxine (SYNTHROID, LEVOXYL) 50 mcg, oral, Daily    lisinopril 40 mg, oral, Daily    metoprolol succinate XL (TOPROL-XL) 150 mg, oral, 2 times daily    multivitamin with minerals (Adults Multivitamin) tablet 1 tablet, oral, Daily    naloxone (NARCAN) 4 mg, nasal, As needed    nicotine (Nicoderm CQ) 21 mg/24 hr patch 1 patch, transdermal, Daily, As directed     nicotine polacrilex (COMMIT) 4 mg, Mouth/Throat, Every 2 hour PRN, Max 5 lozenges every 6 hours; 20 lozenges / day - to increase chances of quitting use at least 9 lozenge/day     omega-3 acid ethyl esters (Lovaza) 1 gram capsule 2 capsules, oral, 2 times daily    oxyCODONE-acetaminophen (Percocet) 5-325 mg tablet 1 tablet, oral, 3 times daily PRN    [START ON 7/31/2024] oxyCODONE-acetaminophen (Percocet) 5-325 mg tablet  "1 tablet, oral, 3 times daily PRN    [START ON 8/29/2024] oxyCODONE-acetaminophen (Percocet) 5-325 mg tablet 1 tablet, oral, 3 times daily PRN    Ozempic 1 mg/dose (4 mg/3 mL) pen injector Inject 1 mg subcutaneously one time a week.    rosuvastatin (CRESTOR) 20 mg, oral, Daily    triamcinolone (Kenalog) 0.1 % cream  APPLY A THIN LAYER TO AFFECTED AREA(S) TWICE DAILY. AVOID FACE, GENITALIA    Unifine Pentips Plus 32 gauge x 5/32\" needle Inject 1 Each subcutaneously every 24 hours. Give with each insulin administration.    ZINC ORAL Unsure of dose<BR>       TOBACCO USE  Social History     Tobacco Use   Smoking Status Every Day    Current packs/day: 1.50    Average packs/day: 1.5 packs/day for 20.0 years (30.0 ttl pk-yrs)    Types: Cigarettes   Smokeless Tobacco Never       DEPRESSION SCREEN  Over the past 2 weeks, how often have you been bothered by any of the following problems?  Little interest or pleasure in doing things: Not at all  Feeling down, depressed, or hopeless: Not at all    SURGICAL HISTORY  No past surgical history on file.       OBJECTIVE    /76   Pulse 56   Temp 36.3 °C (97.4 °F)   Ht 1.854 m (6' 1\")   Wt 147 kg (324 lb 12.8 oz)   SpO2 98%   BMI 42.85 kg/m²    BMI: Estimated body mass index is 42.85 kg/m² as calculated from the following:    Height as of this encounter: 1.854 m (6' 1\").    Weight as of this encounter: 147 kg (324 lb 12.8 oz).    BP Readings from Last 3 Encounters:   07/01/24 147/76   04/01/24 146/73   01/10/24 146/79      Wt Readings from Last 3 Encounters:   07/01/24 147 kg (324 lb 12.8 oz)   04/01/24 150 kg (330 lb 4.8 oz)   01/10/24 149 kg (328 lb)        PHYSICAL EXAM  Physical Exam  Constitutional:       Appearance: Normal appearance. He is obese.   HENT:      Head: Normocephalic and atraumatic.   Cardiovascular:      Rate and Rhythm: Normal rate and regular rhythm.      Heart sounds: No murmur heard.  Pulmonary:      Effort: Pulmonary effort is normal. No respiratory " distress.      Breath sounds: Normal breath sounds. No wheezing.   Musculoskeletal:      Right lower leg: No edema.      Left lower leg: No edema.   Skin:     Findings: No rash.   Neurological:      General: No focal deficit present.      Mental Status: He is alert and oriented to person, place, and time. Mental status is at baseline.   Psychiatric:         Mood and Affect: Mood normal.         Behavior: Behavior normal.         Thought Content: Thought content normal.         Judgment: Judgment normal.          ASSESSMENT AND PLAN  Assessment/Plan   Problem List Items Addressed This Visit       Agoraphobia with panic attacks    Anxiety    Benign essential hypertension    Depression    Excessive daytime sleepiness    Obstructive sleep apnea    Right hip pain    Type 2 diabetes mellitus with other diabetic kidney complication (Multi)    Long term (current) use of opiate analgesic    Chronic bilateral low back pain with right-sided sciatica    Relevant Medications    oxyCODONE-acetaminophen (Percocet) 5-325 mg tablet    oxyCODONE-acetaminophen (Percocet) 5-325 mg tablet (Start on 7/31/2024)    oxyCODONE-acetaminophen (Percocet) 5-325 mg tablet (Start on 8/29/2024)    Acquired hypothyroidism    Relevant Orders    Thyroid Stimulating Hormone     Other Visit Diagnoses       Balanitis    -  Primary    Relevant Medications    clotrimazole (Lotrimin) 1 % cream          Chronic low back pain with right sciatica, lumbar degenerative disc disease, chronic opiate use (Percocet) - has tried pain management injections in the past without improvement. Pain is currently well controlled with Percocet, approximately 3 tabs per day. Opioid risk score is 4 (3 points for family history of substance abuse ) - Jose Eduardo's pain is consistent with his disc disease and he gets adequate relief from his medication. I do not believe he poses a high risk for misuse of medication. The Controlled Substance Agreement is up to date from 10/12/2023.  OARRS reviewed. Urine drug screen 10/12/2023. Narcan 9/7/23. Percocet refilled.      Depression, Agoraphobia with panic attacks - doing well on duloxetine to 30 mg daily, refill sent.     Hypertension - not at goal, continue current medication and attempts at weight loss.     Mixed hyperlipidemia & hypertriglyceridemia - continue statin, fenofibrate.     DM2 with microalbuminuria, nephropathy, uncontrolled - following with endocrinologist at [facility].   More likely that Jardiance is putting him  at risk for balanitis (rather than the Ozempic).      Balanitis - Rx sent for clotrimazole cream.      Hypothyroidism - following with endocrinologist at [facility].   Due for A1c.     Chronic fatigue, sleep disturbance - patient has sleep apnea and is not on therapy.  Sleep study recommended, he declines at this time.      Patient permanently declines flu shot. Declines COVID vaccine.     Follow-up in 90 days.

## 2024-07-16 DIAGNOSIS — G89.29 OTHER CHRONIC PAIN: ICD-10-CM

## 2024-07-16 DIAGNOSIS — I10 BENIGN ESSENTIAL HYPERTENSION: ICD-10-CM

## 2024-07-16 DIAGNOSIS — I48.0 PAROXYSMAL ATRIAL FIBRILLATION (MULTI): ICD-10-CM

## 2024-07-16 DIAGNOSIS — M54.41 LUMBAGO WITH SCIATICA, RIGHT SIDE: ICD-10-CM

## 2024-07-16 RX ORDER — IBUPROFEN 800 MG/1
TABLET ORAL
Qty: 90 TABLET | Refills: 3 | Status: SHIPPED | OUTPATIENT
Start: 2024-07-16

## 2024-07-16 RX ORDER — METOPROLOL SUCCINATE 50 MG/1
TABLET, EXTENDED RELEASE ORAL
Qty: 540 TABLET | Refills: 3 | Status: SHIPPED | OUTPATIENT
Start: 2024-07-16

## 2024-08-15 DIAGNOSIS — E78.1 PURE HYPERGLYCERIDEMIA: ICD-10-CM

## 2024-08-15 RX ORDER — FENOFIBRATE 145 MG/1
145 TABLET, FILM COATED ORAL DAILY
Qty: 90 TABLET | Refills: 3 | Status: SHIPPED | OUTPATIENT
Start: 2024-08-15

## 2024-09-29 DIAGNOSIS — M54.41 CHRONIC BILATERAL LOW BACK PAIN WITH RIGHT-SIDED SCIATICA: ICD-10-CM

## 2024-09-29 DIAGNOSIS — G89.29 CHRONIC BILATERAL LOW BACK PAIN WITH RIGHT-SIDED SCIATICA: ICD-10-CM

## 2024-09-30 ENCOUNTER — TELEPHONE (OUTPATIENT)
Dept: RHEUMATOLOGY | Facility: CLINIC | Age: 54
End: 2024-09-30
Payer: COMMERCIAL

## 2024-09-30 RX ORDER — OXYCODONE AND ACETAMINOPHEN 5; 325 MG/1; MG/1
1 TABLET ORAL 3 TIMES DAILY PRN
Qty: 9 TABLET | Refills: 0 | Status: SHIPPED | OUTPATIENT
Start: 2024-09-30 | End: 2024-10-03 | Stop reason: SDUPTHER

## 2024-09-30 NOTE — TELEPHONE ENCOUNTER
RX Refill- Oxycodone 5-325 mg    Pt states he has only enough pills for today, he is asking for you to give him enough until he sees you this Thursday. Please Advise.     [id number] - [place], [place] - [address] [address]  [address]  [facility] [address]  Phone: [phone] Fax: [phone]

## 2024-10-03 ENCOUNTER — LAB (OUTPATIENT)
Dept: LAB | Facility: LAB | Age: 54
End: 2024-10-03
Payer: COMMERCIAL

## 2024-10-03 ENCOUNTER — APPOINTMENT (OUTPATIENT)
Dept: PRIMARY CARE | Facility: CLINIC | Age: 54
End: 2024-10-03
Payer: COMMERCIAL

## 2024-10-03 VITALS
HEIGHT: 73 IN | DIASTOLIC BLOOD PRESSURE: 77 MMHG | BODY MASS INDEX: 41.75 KG/M2 | HEART RATE: 68 BPM | OXYGEN SATURATION: 97 % | SYSTOLIC BLOOD PRESSURE: 143 MMHG | TEMPERATURE: 97.9 F | WEIGHT: 315 LBS

## 2024-10-03 DIAGNOSIS — E78.2 MIXED HYPERLIPIDEMIA: ICD-10-CM

## 2024-10-03 DIAGNOSIS — Z28.21 IMMUNIZATION DECLINED: ICD-10-CM

## 2024-10-03 DIAGNOSIS — M25.561 CHRONIC PAIN OF RIGHT KNEE: ICD-10-CM

## 2024-10-03 DIAGNOSIS — G89.4 CHRONIC PAIN SYNDROME: ICD-10-CM

## 2024-10-03 DIAGNOSIS — Z79.891 CHRONIC PRESCRIPTION OPIATE USE: ICD-10-CM

## 2024-10-03 DIAGNOSIS — M54.41 CHRONIC BILATERAL LOW BACK PAIN WITH RIGHT-SIDED SCIATICA: Primary | ICD-10-CM

## 2024-10-03 DIAGNOSIS — I10 BENIGN ESSENTIAL HYPERTENSION: ICD-10-CM

## 2024-10-03 DIAGNOSIS — M25.531 RIGHT WRIST PAIN: ICD-10-CM

## 2024-10-03 DIAGNOSIS — M25.511 CHRONIC RIGHT SHOULDER PAIN: ICD-10-CM

## 2024-10-03 DIAGNOSIS — G89.29 CHRONIC BILATERAL LOW BACK PAIN WITH RIGHT-SIDED SCIATICA: Primary | ICD-10-CM

## 2024-10-03 DIAGNOSIS — M51.362 DEGENERATION OF INTERVERTEBRAL DISC OF LUMBAR REGION WITH DISCOGENIC BACK PAIN AND LOWER EXTREMITY PAIN: ICD-10-CM

## 2024-10-03 DIAGNOSIS — E66.01 MORBID OBESITY WITH BMI OF 40.0-44.9, ADULT (MULTI): ICD-10-CM

## 2024-10-03 DIAGNOSIS — M54.41 CHRONIC BILATERAL LOW BACK PAIN WITH RIGHT-SIDED SCIATICA: ICD-10-CM

## 2024-10-03 DIAGNOSIS — M25.551 RIGHT HIP PAIN: ICD-10-CM

## 2024-10-03 DIAGNOSIS — E11.29 TYPE 2 DIABETES MELLITUS WITH OTHER DIABETIC KIDNEY COMPLICATION: ICD-10-CM

## 2024-10-03 DIAGNOSIS — G89.29 CHRONIC PAIN OF RIGHT KNEE: ICD-10-CM

## 2024-10-03 DIAGNOSIS — G89.29 CHRONIC BILATERAL LOW BACK PAIN WITH RIGHT-SIDED SCIATICA: ICD-10-CM

## 2024-10-03 DIAGNOSIS — G89.29 CHRONIC RIGHT SHOULDER PAIN: ICD-10-CM

## 2024-10-03 DIAGNOSIS — E78.1 HYPERTRIGLYCERIDEMIA: ICD-10-CM

## 2024-10-03 LAB
AMPHETAMINES UR QL SCN: ABNORMAL
BARBITURATES UR QL SCN: ABNORMAL
BENZODIAZ UR QL SCN: ABNORMAL
BZE UR QL SCN: ABNORMAL
CANNABINOIDS UR QL SCN: ABNORMAL
FENTANYL+NORFENTANYL UR QL SCN: ABNORMAL
METHADONE UR QL SCN: ABNORMAL
OPIATES UR QL SCN: ABNORMAL
OXYCODONE+OXYMORPHONE UR QL SCN: ABNORMAL
PCP UR QL SCN: ABNORMAL

## 2024-10-03 PROCEDURE — 80307 DRUG TEST PRSMV CHEM ANLYZR: CPT

## 2024-10-03 PROCEDURE — 99214 OFFICE O/P EST MOD 30 MIN: CPT | Performed by: INTERNAL MEDICINE

## 2024-10-03 PROCEDURE — 4010F ACE/ARB THERAPY RXD/TAKEN: CPT | Performed by: INTERNAL MEDICINE

## 2024-10-03 PROCEDURE — 80361 OPIATES 1 OR MORE: CPT

## 2024-10-03 PROCEDURE — 3008F BODY MASS INDEX DOCD: CPT | Performed by: INTERNAL MEDICINE

## 2024-10-03 PROCEDURE — 3078F DIAST BP <80 MM HG: CPT | Performed by: INTERNAL MEDICINE

## 2024-10-03 PROCEDURE — 3077F SYST BP >= 140 MM HG: CPT | Performed by: INTERNAL MEDICINE

## 2024-10-03 PROCEDURE — 80365 DRUG SCREENING OXYCODONE: CPT

## 2024-10-03 RX ORDER — OXYCODONE AND ACETAMINOPHEN 5; 325 MG/1; MG/1
1 TABLET ORAL 3 TIMES DAILY PRN
Qty: 90 TABLET | Refills: 0 | Status: SHIPPED | OUTPATIENT
Start: 2024-10-03

## 2024-10-03 RX ORDER — NALOXONE HYDROCHLORIDE 4 MG/.1ML
4 SPRAY NASAL AS NEEDED
Qty: 2 EACH | Refills: 0 | Status: SHIPPED | OUTPATIENT
Start: 2024-10-03

## 2024-10-03 RX ORDER — OXYCODONE AND ACETAMINOPHEN 5; 325 MG/1; MG/1
1 TABLET ORAL 3 TIMES DAILY PRN
Qty: 90 TABLET | Refills: 0 | Status: SHIPPED | OUTPATIENT
Start: 2024-11-02

## 2024-10-03 RX ORDER — OXYCODONE AND ACETAMINOPHEN 5; 325 MG/1; MG/1
1 TABLET ORAL 3 TIMES DAILY PRN
Qty: 90 TABLET | Refills: 0 | Status: SHIPPED | OUTPATIENT
Start: 2024-12-02

## 2024-10-03 ASSESSMENT — ENCOUNTER SYMPTOMS
DYSPHORIC MOOD: 1
DIARRHEA: 0
NAUSEA: 0
DIZZINESS: 0
FATIGUE: 1
SLEEP DISTURBANCE: 1
BACK PAIN: 1
SHORTNESS OF BREATH: 0
FEVER: 0
NERVOUS/ANXIOUS: 1
LIGHT-HEADEDNESS: 0
CHILLS: 0
COUGH: 0
PALPITATIONS: 0
CONSTIPATION: 0
ARTHRALGIAS: 1
WHEEZING: 0
ABDOMINAL PAIN: 0

## 2024-10-03 ASSESSMENT — PATIENT HEALTH QUESTIONNAIRE - PHQ9
1. LITTLE INTEREST OR PLEASURE IN DOING THINGS: NOT AT ALL
SUM OF ALL RESPONSES TO PHQ9 QUESTIONS 1 AND 2: 0
2. FEELING DOWN, DEPRESSED OR HOPELESS: NOT AT ALL

## 2024-10-03 NOTE — PROGRESS NOTES
CHIEF COMPLAINT  Pain    HISTORY OF PRESENT ILLNESS  Jose Eduardo Diaz is a 54 y.o. male presents today for follow up of Pain    HPI    History reviewed and updated.  Pain is well controlled with Percocet - needs refill.  His main concerns today are diabetes, weight, and appetite.  He sees an endocrinologist at [facility].  Yeast infections triggered by Farxiga, which has since been discontinued.  Continues to have issues with balanitis.  Blood sugar is always high - 200-500.  States he follows healthy diet now - vegetables, fruits, protein, low carb bread.  He states that he's always hungry.      OARRS:  Sharron Kearney MD on 10/3/2024  9:46 AM  I have personally reviewed the OARRS report for Jose Eduardo Diaz. I have considered the risks of abuse, dependence, addiction and diversion and I believe that it is clinically appropriate for Jose Eduardo Diaz to be prescribed this medication    Is the patient prescribed a combination of a benzodiazepine and opioid?  No    Last Urine Drug Screen / ordered today: Yes  Recent Results (from the past 8760 hour(s))   Opiate Confirmation, Urine    Collection Time: 10/12/23 11:28 AM   Result Value Ref Range    6-Acetylmorphine <25 <25 ng/mL    Codeine <50 <50 ng/mL    Hydrocodone <25 <25 ng/mL    Hydromorphone <25 <25 ng/mL    Morphine  <50 <50 ng/mL    Norhydrocodone <25 <25 ng/mL    Noroxycodone 143 (H) <25 ng/mL    Oxycodone 294 (H) <25 ng/mL    Oxymorphone 93 (H) <25 ng/mL   Drug Screen, Urine With Reflex to Confirmation    Collection Time: 10/12/23 11:28 AM   Result Value Ref Range    Amphetamine Screen, Urine Presumptive Negative Presumptive Negative    Barbiturate Screen, Urine Presumptive Negative Presumptive Negative    Benzodiazepines Screen, Urine Presumptive Negative Presumptive Negative    Cannabinoid Screen, Urine Presumptive Negative Presumptive Negative    Cocaine Metabolite Screen, Urine Presumptive Negative Presumptive Negative    Fentanyl Screen, Urine Presumptive Negative  Presumptive Negative    Opiate Screen, Urine Presumptive Negative Presumptive Negative    Oxycodone Screen, Urine Presumptive Positive (A) Presumptive Negative    PCP Screen, Urine Presumptive Negative Presumptive Negative     N/A    Controlled Substance Agreement:  Date of the Last Agreement: 10/12/23  Reviewed Controlled Substance Agreement including but not limited to the benefits, risks, and alternatives to treatment with a Controlled Substance medication(s).    Opioids:  What is the patient's goal of therapy? Pain relief   Is this being achieved with current treatment? yes    I have calculated the patient's Morphine Dose Equivalent (MED): 22.5  I have considered referral to Pain Management and/or a specialist, and do not feel it is necessary at this time.    I feel that it is clinically indicated to continue this current medication regimen after consideration of alternative therapies, and other non-opioid treatment.    Opioid Risk Screening:  Family History of Substance Abuse  Alcohol: 0 (10/13/2023  7:00 AM)  Illegal Drugs: 3 (10/13/2023  7:00 AM)  Prescription Drugs: 0 (10/13/2023  7:00 AM)    Personal History of Substance Abuse  Alcohol: 0 (10/13/2023  7:00 AM)  Illegal Drugs: 0 (10/13/2023  7:00 AM)  Prescription Drugs: 0 (10/13/2023  7:00 AM)    Patient Age (16-45)  Age (16-45): 0 (10/13/2023  7:00 AM)    History of Preadolescent Sexual Abuse  History of Preadolescent Sexual Abuse: .0 (10/13/2023  7:00 AM)    Psychological Disease  Attention Deficit Disorder, Obsessive Compulsive Disorder, Bipolar, Schizophrenia: 0 (10/13/2023  7:00 AM)  Depression: 1 (10/13/2023  7:00 AM)    Total Score  Total Score: 4 (10/13/2023  7:00 AM)    Total Score Risk Category  TOTAL SCORE CATEGORY: Moderate Risk (4-7) (10/13/2023  7:00 AM)        Pain Assessment:  Analgesia  What was your pain level on average during the past week?: 3  What was your pain level at its worst during the past week?: 3  What percentage of your pain  has been relieved during the past week?: 99 %  Is the amount of pain relief you are now obtaining from your current pain relievers enough to make a real difference in your life?: Y  Query to Clinician: Is the patient's pain relief clinically significant?: Yes    Activities of Daily Living  Physical Functioning: Same  Family Relationships: Same  Social Relationships: Same  Mood: Same  Sleep Patterns: Same  Overall Functioning: Same    Adverse Events  Is patient experiencing any side effects from current pain relievers?: N      Assessment  Is your overall impression that this patient is benefiting from opioid therapy?: Yes  Specific Analgesic Plan: Continue present regimen        REVIEW OF SYSTEMS  Review of Systems   Constitutional:  Positive for fatigue. Negative for chills and fever.   Respiratory:  Negative for cough, shortness of breath and wheezing.    Cardiovascular:  Negative for chest pain, palpitations and leg swelling.   Gastrointestinal:  Negative for abdominal pain, constipation, diarrhea and nausea.   Musculoskeletal:  Positive for arthralgias and back pain.   Skin:  Positive for rash.   Neurological:  Negative for dizziness and light-headedness.   Psychiatric/Behavioral:  Positive for dysphoric mood and sleep disturbance. The patient is nervous/anxious.        ALLERGIES  Penicillins    MEDICATIONS  Current Outpatient Medications   Medication Instructions    albuterol 2.5 mg, inhalation,  EVERY 4-6 HOURS AS NEEDED FOR WHEEZING .<BR>    amLODIPine (NORVASC) 10 mg, oral, Daily    apixaban (ELIQUIS) 5 mg, oral, 2 times daily    clotrimazole (Lotrimin) 1 % cream Topical, 2 times daily    colchicine, gout, 0.6 mg tablet oral, Daily, Take 2 tabs at the first sign of flare, followed in 1 hour with a single dose of .06 mg.  Continue 1 tablet twice daily until flare resolves     dextromethorphan-guaifenesin (MUCINEX DM) 60-1,200 mg tablet extended release 12 hr 1 tablet, oral, Every 12 hours, AS NEEDED FOR  CONGESTION.    docosahexaenoic acid/epa (FISH OIL ORAL) Take as directed <BR>    DULoxetine (CYMBALTA) 30 mg, oral, Daily    ergocalciferol (VITAMIN D-2) 1,250 mcg, oral, Once Weekly    esomeprazole (NEXIUM) 40 mg, oral, Daily    fenofibrate (TRICOR) 145 mg, oral, Daily    fluticasone (Flonase) 50 mcg/actuation nasal spray 1 spray, Each Nostril, 2 times daily, Shake gently. Before first use, prime pump. After use, clean tip and replace cap.    glipiZIDE (GLUCOTROL) 10 mg, oral, 2 times daily before meals    ibuprofen 800 mg tablet TAKE 1 TABLET BY MOUTH THREE TIMES DAILY AS NEEDED FOR 30 DAYS.    insulin glargine (LANTUS) 30 Units, subcutaneous, Daily RT    lancets (OneTouch UltraSoft Lancets) misc miscellaneous, Check blood glucose after fasting once daily     levothyroxine (SYNTHROID, LEVOXYL) 50 mcg, oral, Daily    lisinopril 40 mg, oral, Daily    metoprolol succinate XL (Toprol-XL) 50 mg 24 hr tablet TAKE 3 TABLETS BY MOUTH EVERY MORNING AND TAKE 3 TABLETS BY MOUTH EVERY NIGHT.    multivitamin with minerals (Adults Multivitamin) tablet 1 tablet, oral, Daily    naloxone (NARCAN) 4 mg, nasal, As needed    nicotine (Nicoderm CQ) 21 mg/24 hr patch 1 patch, transdermal, Daily, As directed     nicotine polacrilex (COMMIT) 4 mg, Mouth/Throat, Every 2 hour PRN, Max 5 lozenges every 6 hours; 20 lozenges / day - to increase chances of quitting use at least 9 lozenge/day     omega-3 acid ethyl esters (Lovaza) 1 gram capsule 2 capsules, oral, 2 times daily    oxyCODONE-acetaminophen (Percocet) 5-325 mg tablet 1 tablet, oral, 3 times daily PRN    [START ON 11/2/2024] oxyCODONE-acetaminophen (Percocet) 5-325 mg tablet 1 tablet, oral, 3 times daily PRN    [START ON 12/2/2024] oxyCODONE-acetaminophen (Percocet) 5-325 mg tablet 1 tablet, oral, 3 times daily PRN    rosuvastatin (CRESTOR) 20 mg, oral, Daily    triamcinolone (Kenalog) 0.1 % cream  APPLY A THIN LAYER TO AFFECTED AREA(S) TWICE DAILY. AVOID FACE, GENITALIA    Unifine  "Pentips Plus 32 gauge x 5/32\" needle Inject 1 Each subcutaneously every 24 hours. Give with each insulin administration.    ZINC ORAL Unsure of dose<BR>       TOBACCO USE  Social History     Tobacco Use   Smoking Status Every Day    Current packs/day: 1.50    Average packs/day: 1.5 packs/day for 20.0 years (30.0 ttl pk-yrs)    Types: Cigarettes   Smokeless Tobacco Never       DEPRESSION SCREEN  Over the past 2 weeks, how often have you been bothered by any of the following problems?  Little interest or pleasure in doing things: Not at all  Feeling down, depressed, or hopeless: Not at all    SURGICAL HISTORY  No past surgical history on file.       OBJECTIVE    /77   Pulse 68   Temp 36.6 °C (97.9 °F)   Ht 1.854 m (6' 1\")   Wt 149 kg (327 lb 11.2 oz)   SpO2 97%   BMI 43.23 kg/m²    BMI: Estimated body mass index is 43.23 kg/m² as calculated from the following:    Height as of this encounter: 1.854 m (6' 1\").    Weight as of this encounter: 149 kg (327 lb 11.2 oz).    BP Readings from Last 3 Encounters:   10/03/24 143/77   07/01/24 147/76   04/01/24 146/73      Wt Readings from Last 3 Encounters:   10/03/24 149 kg (327 lb 11.2 oz)   07/01/24 147 kg (324 lb 12.8 oz)   04/01/24 150 kg (330 lb 4.8 oz)        PHYSICAL EXAM  Physical Exam  Constitutional:       Appearance: Normal appearance. He is obese.   HENT:      Head: Normocephalic and atraumatic.   Cardiovascular:      Rate and Rhythm: Normal rate and regular rhythm.      Heart sounds: No murmur heard.  Pulmonary:      Effort: Pulmonary effort is normal. No respiratory distress.      Breath sounds: Normal breath sounds. No wheezing.   Musculoskeletal:      Right lower leg: No edema.      Left lower leg: No edema.   Skin:     Findings: No rash.   Neurological:      General: No focal deficit present.      Mental Status: He is alert and oriented to person, place, and time. Mental status is at baseline.   Psychiatric:         Mood and Affect: Mood normal.    "      Behavior: Behavior normal.         Thought Content: Thought content normal.         Judgment: Judgment normal.          ASSESSMENT AND PLAN  Assessment/Plan   Problem List Items Addressed This Visit       Benign essential hypertension    Chronic pain syndrome    Mixed hyperlipidemia    Right knee pain    Right hip pain    Right wrist pain    Shoulder pain, right    Type 2 diabetes mellitus with other diabetic kidney complication    Morbid obesity with BMI of 40.0-44.9, adult (Multi)    Chronic bilateral low back pain with right-sided sciatica - Primary    Relevant Medications    oxyCODONE-acetaminophen (Percocet) 5-325 mg tablet    oxyCODONE-acetaminophen (Percocet) 5-325 mg tablet (Start on 11/2/2024)    oxyCODONE-acetaminophen (Percocet) 5-325 mg tablet (Start on 12/2/2024)    Other Relevant Orders    Drug Screen, Urine With Reflex to Confirmation    Hypertriglyceridemia    DDD (degenerative disc disease), lumbar    Immunization declined     Other Visit Diagnoses       Chronic prescription opiate use        Relevant Orders    Drug Screen, Urine With Reflex to Confirmation            Chronic low back pain with right sciatica, lumbar degenerative disc disease, chronic opiate use (Percocet) - has tried pain management injections in the past without improvement. Pain is currently well controlled with Percocet, approximately 3 tabs per day. Opioid risk score is 4 (3 points for family history of substance abuse ) - Jose Eduardo's pain is consistent with his disc disease and he gets adequate relief from his medication. I do not believe he poses a high risk for misuse of medication. The Controlled Substance Agreement is up to date from 10/12/2023.  OARRS reviewed.  Urine drug screen 10/3/24.  Narcan 10/3/24.  Percocet refilled.      Hypertension - not at goal, continue current medication and attempts at weight loss.     Mixed hyperlipidemia & hypertriglyceridemia - continue statin, fenofibrate.     DM2 with  microalbuminuria, nephropathy, uncontrolled - following with endocrinologist at [facility].    - Discussed importance of good  nutrition, but also calorie reduction for weight loss.  - I recommend the [employer] jacqui for assistance with tracking calories.  - States he is scheduled to see a dietician.      Patient permanently declines flu shot. Declines COVID vaccine.     Follow-up in 90 days.

## 2024-10-08 LAB
6MAM UR CFM-MCNC: <25 NG/ML
CODEINE UR CFM-MCNC: <50 NG/ML
HYDROCODONE CTO UR CFM-MCNC: <25 NG/ML
HYDROMORPHONE UR CFM-MCNC: <25 NG/ML
MORPHINE UR CFM-MCNC: <50 NG/ML
NORHYDROCODONE UR CFM-MCNC: <25 NG/ML
NOROXYCODONE UR CFM-MCNC: 386 NG/ML
OXYCODONE UR CFM-MCNC: 339 NG/ML
OXYMORPHONE UR CFM-MCNC: 568 NG/ML

## 2024-11-15 DIAGNOSIS — M54.41 LUMBAGO WITH SCIATICA, RIGHT SIDE: ICD-10-CM

## 2024-11-15 DIAGNOSIS — G89.29 OTHER CHRONIC PAIN: ICD-10-CM

## 2024-11-15 RX ORDER — IBUPROFEN 800 MG/1
TABLET ORAL
Qty: 90 TABLET | Refills: 3 | Status: SHIPPED | OUTPATIENT
Start: 2024-11-15

## 2024-12-23 ENCOUNTER — TELEPHONE (OUTPATIENT)
Dept: RHEUMATOLOGY | Facility: CLINIC | Age: 54
End: 2024-12-23

## 2024-12-23 ENCOUNTER — OFFICE VISIT (OUTPATIENT)
Dept: PRIMARY CARE | Facility: CLINIC | Age: 54
End: 2024-12-23
Payer: COMMERCIAL

## 2024-12-23 VITALS
RESPIRATION RATE: 16 BRPM | HEIGHT: 73 IN | DIASTOLIC BLOOD PRESSURE: 79 MMHG | WEIGHT: 310.4 LBS | HEART RATE: 71 BPM | BODY MASS INDEX: 41.14 KG/M2 | OXYGEN SATURATION: 96 % | TEMPERATURE: 98.6 F | SYSTOLIC BLOOD PRESSURE: 149 MMHG

## 2024-12-23 DIAGNOSIS — E66.01 CLASS 3 SEVERE OBESITY WITH SERIOUS COMORBIDITY AND BODY MASS INDEX (BMI) OF 40.0 TO 44.9 IN ADULT, UNSPECIFIED OBESITY TYPE: ICD-10-CM

## 2024-12-23 DIAGNOSIS — E66.813 CLASS 3 SEVERE OBESITY WITH SERIOUS COMORBIDITY AND BODY MASS INDEX (BMI) OF 40.0 TO 44.9 IN ADULT, UNSPECIFIED OBESITY TYPE: ICD-10-CM

## 2024-12-23 DIAGNOSIS — H10.31 ACUTE BACTERIAL CONJUNCTIVITIS OF RIGHT EYE: Primary | ICD-10-CM

## 2024-12-23 DIAGNOSIS — H44.001 INFECTION OF RIGHT EYE: ICD-10-CM

## 2024-12-23 DIAGNOSIS — E66.01 MORBID OBESITY WITH BMI OF 40.0-44.9, ADULT (MULTI): ICD-10-CM

## 2024-12-23 DIAGNOSIS — H57.89 REDNESS AND DISCHARGE OF EYE: ICD-10-CM

## 2024-12-23 PROCEDURE — 99212 OFFICE O/P EST SF 10 MIN: CPT | Performed by: NURSE PRACTITIONER

## 2024-12-23 RX ORDER — ROSUVASTATIN CALCIUM 40 MG/1
1 TABLET, COATED ORAL
COMMUNITY
Start: 2024-10-09

## 2024-12-23 RX ORDER — INSULIN ASPART 100 [IU]/ML
INJECTION, SUSPENSION SUBCUTANEOUS
COMMUNITY

## 2024-12-23 RX ORDER — EMPAGLIFLOZIN 25 MG/1
25 TABLET, FILM COATED ORAL
COMMUNITY
Start: 2024-11-25

## 2024-12-23 RX ORDER — FAMOTIDINE 40 MG/1
1 TABLET, FILM COATED ORAL
COMMUNITY
Start: 2024-04-25

## 2024-12-23 RX ORDER — TOBRAMYCIN 3 MG/ML
1 SOLUTION/ DROPS OPHTHALMIC EVERY 6 HOURS
Qty: 5 ML | Refills: 0 | Status: SHIPPED | OUTPATIENT
Start: 2024-12-23 | End: 2024-12-30

## 2024-12-23 ASSESSMENT — PATIENT HEALTH QUESTIONNAIRE - PHQ9
1. LITTLE INTEREST OR PLEASURE IN DOING THINGS: NOT AT ALL
2. FEELING DOWN, DEPRESSED OR HOPELESS: NOT AT ALL
SUM OF ALL RESPONSES TO PHQ9 QUESTIONS 1 AND 2: 0

## 2024-12-23 NOTE — PROGRESS NOTES
"Subjective   Patient ID: Jose Eduardo Diaz is a 54 y.o. male who presents for eye infection and Conjunctivitis.      Symptoms: watery itchy, draining ,right eye, had discharge when waking up.  Length of symptoms: 2 days ago  OTC: eyedrops visine and allergy relief with mild help  Related information:    HPI     Review of Systems    Objective   /79   Pulse 71   Temp 37 °C (98.6 °F)   Resp 16   Ht 1.854 m (6' 1\")   Wt 141 kg (310 lb 6.4 oz)   SpO2 96%   BMI 40.95 kg/m²     Physical Exam    Assessment/Plan          "

## 2024-12-23 NOTE — PROGRESS NOTES
"Subjective   Patient ID: Jose Eduardo Diaz is a 54 y.o. male who is with complaints of redness, irritation, and discharge on his right eye.     HPI   Patient is a 54 y.o. male who CONSULTED AT [facility] today. Patient is with complaints of redness, irritation, and discharge on his right eye. he states that the symptoms started about 2 days ago. he denies trauma/injury to the eye, use of contact lens, nor any exposure to people with same symptoms. he states there were some yellow mucoid discharge that became crusty today. he states there were no eye pain nor photophobia. he denies any blurring of vision, visual floaters, double vision, nor change in visual acuity. he denies fever, nausea, vomiting, headache, nor any nasal congestion nor discharge.    Review of Systems  General: no weight loss, generally healthy, no fatigue  Head:  no headaches / sinus pain, no vertigo, no injury  Eyes: (+) redness, irritation, and discharge on the right eye, no diplopia, no pain,   Ears: no change in hearing, no tinnitus, no bleeding, no vertigo  Mouth:  no dental difficulties, no gingival bleeding, no sore throat, no loss of sense of taste  Nose: no congestion, no  discharge, no bleeding, no obstruction, no loss of sense of smell  Neck: no stiffness, no pain, no tenderness, no masses, no bruit  Pulmonary: no dyspnea, no wheezing, no hemoptysis, no cough  Cardiovascular: no chest pain, no palpitations, no syncope, no orthopnea  Gastrointestinal: no change in appetite, no dysphagia, no abdominal pains, no diarrhea, no emesis, no melena  Genito Urinary: no dysuria, no urinary urgency, no nocturia, no incontinence, no change in nature of urine  Musculoskeletal: no muscle ache, no joint pain, no limitation of range of motion, no paresthesia, no numbness  Constitutional: no fever, no chills, no night sweats    Objective   /79   Pulse 71   Temp 37 °C (98.6 °F)   Resp 16   Ht 1.854 m (6' 1\")   Wt 141 kg (310 " lb 6.4 oz)   SpO2 96%   BMI 40.95 kg/m²     Physical Exam  General: ambulatory, in no acute distress  Head: normocephalic, no lesions  Eyes: pink palpebral conjunctiva, anicteric sclerae, PERRLA, EOM's full, LEFT EYE: no redness, no tearing, no photophobia, RIGHT EYE: (+) subconjunctival injection, (+) redness, (+) tearing, (+) yellow mucoid discharge, no photophobia  Ears: clear external auditory canals, no ear discharge, no bleeding from the ears, tympanic membrane intact  Nose: nasal mucosa normal, no nasal discharge, no bleeding, no obstruction  Throat: clear, no exudate, no lesions  Neck: supple, no masses, no bruits    Assessment/Plan   Problem List Items Addressed This Visit             ICD-10-CM    Morbid obesity with BMI of 40.0-44.9, adult (Multi) E66.01, Z68.41     Other Visit Diagnoses         Codes    Acute bacterial conjunctivitis of right eye    -  Primary H10.31    Relevant Medications    tobramycin (Tobrex) 0.3 % ophthalmic solution    Other Relevant Orders    Visual acuity screening (Completed)    Infection of right eye     H44.001    Relevant Medications    tobramycin (Tobrex) 0.3 % ophthalmic solution    Other Relevant Orders    Visual acuity screening (Completed)    Redness and discharge of eye     H57.89    Relevant Medications    tobramycin (Tobrex) 0.3 % ophthalmic solution    Other Relevant Orders    Visual acuity screening (Completed)    Class 3 severe obesity with serious comorbidity and body mass index (BMI) of 40.0 to 44.9 in adult, unspecified obesity type     E66.813, E66.01, Z68.41        Patient underwent vision screening (Snellen chart)  Procedure done by MA    DISCHARGE SUMMARY:   Patient was seen and examined. Diagnosis, treatment, treatment options, and possible complications of today's illness discussed and explained to patient. Patient to take medication/s associated with this visit. Patient may also take OTC analgesic/antipyretic if needed for pain/fever. Advised eye  protection, hand hygiene/washing, personal hygiene, and refrain from using contact lenses. Advised to come back if with worsening or persistent symptoms. Patient verbalized understanding of plan of care.    Patient to come back in 7 - 10 days if needed for worsening symptoms.

## 2024-12-23 NOTE — TELEPHONE ENCOUNTER
Patient called, states you called in a script for erythromycin .5% eye ointment a year or so ago and wants to know if you can it in for him     AXEL Hurtado

## 2024-12-24 ASSESSMENT — ENCOUNTER SYMPTOMS
DEPRESSION: 0
LOSS OF SENSATION IN FEET: 0
OCCASIONAL FEELINGS OF UNSTEADINESS: 0

## 2024-12-24 NOTE — PATIENT INSTRUCTIONS
DISCHARGE SUMMARY:   Patient was seen and examined. Diagnosis, treatment, treatment options, and possible complications of today's illness discussed and explained to patient. Patient to take medication/s associated with this visit. Patient may also take OTC analgesic/antipyretic if needed for pain/fever. Advised eye protection, hand hygiene/washing, personal hygiene, and refrain from using contact lenses. Advised to come back if with worsening or persistent symptoms. Patient verbalized understanding of plan of care.    Patient to come back in 7 - 10 days if needed for worsening symptoms.

## 2025-01-02 ENCOUNTER — TELEPHONE (OUTPATIENT)
Dept: PRIMARY CARE | Facility: CLINIC | Age: 55
End: 2025-01-02

## 2025-01-02 ENCOUNTER — APPOINTMENT (OUTPATIENT)
Dept: PRIMARY CARE | Facility: CLINIC | Age: 55
End: 2025-01-02
Payer: COMMERCIAL

## 2025-01-02 DIAGNOSIS — G89.29 CHRONIC BILATERAL LOW BACK PAIN WITH RIGHT-SIDED SCIATICA: ICD-10-CM

## 2025-01-02 DIAGNOSIS — M54.41 CHRONIC BILATERAL LOW BACK PAIN WITH RIGHT-SIDED SCIATICA: ICD-10-CM

## 2025-01-02 RX ORDER — OXYCODONE AND ACETAMINOPHEN 5; 325 MG/1; MG/1
1 TABLET ORAL 3 TIMES DAILY PRN
Qty: 90 TABLET | Refills: 0 | Status: SHIPPED | OUTPATIENT
Start: 2025-01-02

## 2025-01-02 NOTE — TELEPHONE ENCOUNTER
Pt needs refill.  Pt will be out today.   Pt had appt today, rescheduled to 01/13/25.     oxyCODONE-acetaminophen (Percocet) 5-325 mg tablet       [id number] - [place], [place] - [address] [address]  [address]  [facility] [address]  Phone: [phone] Fax: [phone]

## 2025-01-13 ENCOUNTER — APPOINTMENT (OUTPATIENT)
Dept: PRIMARY CARE | Facility: CLINIC | Age: 55
End: 2025-01-13
Payer: COMMERCIAL

## 2025-01-13 VITALS
WEIGHT: 310 LBS | TEMPERATURE: 97 F | BODY MASS INDEX: 41.08 KG/M2 | HEIGHT: 73 IN | OXYGEN SATURATION: 98 % | HEART RATE: 68 BPM | DIASTOLIC BLOOD PRESSURE: 74 MMHG | SYSTOLIC BLOOD PRESSURE: 152 MMHG

## 2025-01-13 DIAGNOSIS — I10 ESSENTIAL (PRIMARY) HYPERTENSION: ICD-10-CM

## 2025-01-13 DIAGNOSIS — M51.362 DEGENERATION OF INTERVERTEBRAL DISC OF LUMBAR REGION WITH DISCOGENIC BACK PAIN AND LOWER EXTREMITY PAIN: ICD-10-CM

## 2025-01-13 DIAGNOSIS — J44.9 CHRONIC OBSTRUCTIVE PULMONARY DISEASE, UNSPECIFIED COPD TYPE (MULTI): ICD-10-CM

## 2025-01-13 DIAGNOSIS — I10 BENIGN ESSENTIAL HYPERTENSION: Primary | ICD-10-CM

## 2025-01-13 DIAGNOSIS — E11.29 TYPE 2 DIABETES MELLITUS WITH OTHER DIABETIC KIDNEY COMPLICATION: ICD-10-CM

## 2025-01-13 DIAGNOSIS — G89.29 CHRONIC BILATERAL LOW BACK PAIN WITH RIGHT-SIDED SCIATICA: ICD-10-CM

## 2025-01-13 DIAGNOSIS — M54.41 CHRONIC BILATERAL LOW BACK PAIN WITH RIGHT-SIDED SCIATICA: ICD-10-CM

## 2025-01-13 DIAGNOSIS — G89.4 CHRONIC PAIN SYNDROME: ICD-10-CM

## 2025-01-13 DIAGNOSIS — Z79.891 LONG TERM (CURRENT) USE OF OPIATE ANALGESIC: ICD-10-CM

## 2025-01-13 PROCEDURE — 3077F SYST BP >= 140 MM HG: CPT | Performed by: INTERNAL MEDICINE

## 2025-01-13 PROCEDURE — 3078F DIAST BP <80 MM HG: CPT | Performed by: INTERNAL MEDICINE

## 2025-01-13 PROCEDURE — 3008F BODY MASS INDEX DOCD: CPT | Performed by: INTERNAL MEDICINE

## 2025-01-13 PROCEDURE — 4010F ACE/ARB THERAPY RXD/TAKEN: CPT | Performed by: INTERNAL MEDICINE

## 2025-01-13 PROCEDURE — 99214 OFFICE O/P EST MOD 30 MIN: CPT | Performed by: INTERNAL MEDICINE

## 2025-01-13 RX ORDER — OXYCODONE AND ACETAMINOPHEN 5; 325 MG/1; MG/1
1 TABLET ORAL 3 TIMES DAILY PRN
Qty: 90 TABLET | Refills: 0 | Status: SHIPPED | OUTPATIENT
Start: 2025-04-02

## 2025-01-13 RX ORDER — OXYCODONE AND ACETAMINOPHEN 5; 325 MG/1; MG/1
1 TABLET ORAL 3 TIMES DAILY PRN
Qty: 90 TABLET | Refills: 0 | Status: SHIPPED | OUTPATIENT
Start: 2025-03-03

## 2025-01-13 RX ORDER — AMLODIPINE BESYLATE 10 MG/1
10 TABLET ORAL DAILY
Qty: 90 TABLET | Refills: 3 | Status: SHIPPED | OUTPATIENT
Start: 2025-01-13

## 2025-01-13 RX ORDER — OXYCODONE AND ACETAMINOPHEN 5; 325 MG/1; MG/1
1 TABLET ORAL 3 TIMES DAILY PRN
Qty: 90 TABLET | Refills: 0 | Status: SHIPPED | OUTPATIENT
Start: 2025-02-01

## 2025-01-13 NOTE — PROGRESS NOTES
CHIEF COMPLAINT  Pain    HISTORY OF PRESENT ILLNESS  Jose Eduardo Diaz is a 54 y.o. male presents today for follow up of Pain    HPI    Last fill 1/2/25 oxycodone-acetaminophen 5/325 mg #90, 22.5 MME    States he is off his diabetes medications.  I attempted to review his medications with him again, but he is not familiar with the names of his medications and what they are for.  He follows with endocrinologist.  He is working on weight loss.  He prefers not to add any blood pressure medication.     OARRS:  Sharron Kearney MD on 1/13/2025  5:10 PM  I have personally reviewed the OARRS report for Jose Eduardo Diaz. I have considered the risks of abuse, dependence, addiction and diversion and I believe that it is clinically appropriate for Jose Eduardo Diaz to be prescribed this medication    Is the patient prescribed a combination of a benzodiazepine and opioid?  No    Last Urine Drug Screen / ordered today: No  Recent Results (from the past 8760 hours)   Opiate Confirmation, Urine    Collection Time: 10/03/24 10:25 AM   Result Value Ref Range    6-Acetylmorphine <25 <25 ng/mL    Codeine <50 <50 ng/mL    Hydrocodone <25 <25 ng/mL    Hydromorphone <25 <25 ng/mL    Morphine  <50 <50 ng/mL    Norhydrocodone <25 <25 ng/mL    Noroxycodone 386 (H) <25 ng/mL    Oxycodone 339 (H) <25 ng/mL    Oxymorphone 568 (H) <25 ng/mL   Drug Screen, Urine With Reflex to Confirmation    Collection Time: 10/03/24 10:25 AM   Result Value Ref Range    Amphetamine Screen, Urine Presumptive Negative Presumptive Negative    Barbiturate Screen, Urine Presumptive Negative Presumptive Negative    Benzodiazepines Screen, Urine Presumptive Negative Presumptive Negative    Cannabinoid Screen, Urine Presumptive Negative Presumptive Negative    Cocaine Metabolite Screen, Urine Presumptive Negative Presumptive Negative    Fentanyl Screen, Urine Presumptive Negative Presumptive Negative    Opiate Screen, Urine Presumptive Negative Presumptive Negative    Oxycodone  Screen, Urine Presumptive Positive (A) Presumptive Negative    PCP Screen, Urine Presumptive Negative Presumptive Negative    Methadone Screen, Urine Presumptive Negative Presumptive Negative     Results are as expected.         Controlled Substance Agreement:  Date of the Last Agreement: 1/13/25  Reviewed Controlled Substance Agreement including but not limited to the benefits, risks, and alternatives to treatment with a Controlled Substance medication(s).    Opioids:  What is the patient's goal of therapy? Joint pain relief  Is this being achieved with current treatment? yes    I have calculated the patient's Morphine Dose Equivalent (MED): 22.5  I have considered referral to Pain Management and/or a specialist, and do not feel it is necessary at this time.    I feel that it is clinically indicated to continue this current medication regimen after consideration of alternative therapies, and other non-opioid treatment.    Opioid Risk Screening:  No change in risk score since last assessment    Pain Assessment:  Analgesia  What was your pain level on average during the past week?: 4  What was your pain level at its worst during the past week?: 8  What percentage of your pain has been relieved during the past week?: 80 %  Is the amount of pain relief you are now obtaining from your current pain relievers enough to make a real difference in your life?: Y  Query to Clinician: Is the patient's pain relief clinically significant?: Yes    Activities of Daily Living  Physical Functioning: Better  Family Relationships: Better  Social Relationships: Better  Mood: Better  Sleep Patterns: Better  Overall Functioning: Better    Adverse Events  Is patient experiencing any side effects from current pain relievers?: N      Assessment  Is your overall impression that this patient is benefiting from opioid therapy?: Yes  Specific Analgesic Plan: Continue present regimen        REVIEW OF SYSTEMS  Review of Systems   Constitutional:   Positive for fatigue. Negative for chills and fever.   Respiratory:  Negative for cough, shortness of breath and wheezing.    Cardiovascular:  Negative for chest pain, palpitations and leg swelling.   Gastrointestinal:  Negative for abdominal pain, constipation, diarrhea and nausea.   Musculoskeletal:  Positive for arthralgias and back pain.   Neurological:  Negative for dizziness and light-headedness.   Psychiatric/Behavioral:  Positive for dysphoric mood.        ALLERGIES  Penicillins    MEDICATIONS  Current Outpatient Medications   Medication Instructions    albuterol 2.5 mg    amLODIPine (NORVASC) 10 mg, oral, Daily    apixaban (ELIQUIS) 5 mg, oral, 2 times daily    clotrimazole (Lotrimin) 1 % cream Topical, 2 times daily    colchicine, gout, 0.6 mg tablet Daily    dextromethorphan-guaifenesin (MUCINEX DM) 60-1,200 mg tablet extended release 12 hr 1 tablet, Every 12 hours    docosahexaenoic acid/epa (FISH OIL ORAL) Take as directed    DULoxetine (CYMBALTA) 30 mg, oral, Daily    ergocalciferol (VITAMIN D-2) 1,250 mcg, oral, Once Weekly    esomeprazole (NEXIUM) 40 mg, oral, Daily    famotidine (Pepcid) 40 mg tablet 1 tablet, Daily (0630)    fenofibrate (TRICOR) 145 mg, oral, Daily    fluticasone (Flonase) 50 mcg/actuation nasal spray 1 spray, 2 times daily    glipiZIDE (GLUCOTROL) 10 mg, oral, 2 times daily before meals    ibuprofen 800 mg tablet TAKE 1 TABLET BY MOUTH THREE TIMES DAILY AS NEEDED FOR 30 DAYS.    lancets (OneTouch UltraSoft Lancets) misc Check blood glucose after fasting once daily    levothyroxine (SYNTHROID, LEVOXYL) 50 mcg, Daily    lisinopril 40 mg, oral, Daily    metoprolol succinate XL (Toprol-XL) 50 mg 24 hr tablet TAKE 3 TABLETS BY MOUTH EVERY MORNING AND TAKE 3 TABLETS BY MOUTH EVERY NIGHT.    multivitamin with minerals (Adults Multivitamin) tablet 1 tablet, Daily    naloxone (NARCAN) 4 mg, nasal, As needed    nicotine (Nicoderm CQ) 21 mg/24 hr patch 1 patch, Daily    nicotine polacrilex  "(COMMIT) 4 mg, Every 2 hour PRN    NovoLOG Mix 70-30FlexPen U-100 100 unit/mL (70-30) injection 2 times daily (morning and late afternoon)    omega-3 acid ethyl esters (Lovaza) 1 gram capsule 2 capsules, 2 times daily    [START ON 2/1/2025] oxyCODONE-acetaminophen (Percocet) 5-325 mg tablet 1 tablet, oral, 3 times daily PRN    [START ON 3/3/2025] oxyCODONE-acetaminophen (Percocet) 5-325 mg tablet 1 tablet, oral, 3 times daily PRN    [START ON 4/2/2025] oxyCODONE-acetaminophen (Percocet) 5-325 mg tablet 1 tablet, oral, 3 times daily PRN    rosuvastatin (Crestor) 40 mg tablet 1 tablet, Daily (0630)    triamcinolone (Kenalog) 0.1 % cream APPLY A THIN LAYER TO AFFECTED AREA(S) TWICE DAILY. AVOID FACE, GENITALIA    Unifine Pentips Plus 32 gauge x 5/32\" needle Inject 1 Each subcutaneously every 24 hours. Give with each insulin administration.    ZINC ORAL Unsure of dose       TOBACCO USE  Social History     Tobacco Use   Smoking Status Every Day    Current packs/day: 1.50    Average packs/day: 1.5 packs/day for 20.0 years (30.0 ttl pk-yrs)    Types: Cigarettes   Smokeless Tobacco Never       DEPRESSION SCREEN  Over the past 2 weeks, how often have you been bothered by any of the following problems?  Little interest or pleasure in doing things: Not at all  Feeling down, depressed, or hopeless: Not at all    SURGICAL HISTORY  No past surgical history on file.       OBJECTIVE    /74   Pulse 68   Temp 36.1 °C (97 °F)   Ht 1.854 m (6' 1\")   Wt 141 kg (310 lb)   SpO2 98%   BMI 40.90 kg/m²    BMI: Estimated body mass index is 40.9 kg/m² as calculated from the following:    Height as of this encounter: 1.854 m (6' 1\").    Weight as of this encounter: 141 kg (310 lb).    BP Readings from Last 3 Encounters:   01/13/25 152/74   12/23/24 149/79   10/03/24 143/77      Wt Readings from Last 3 Encounters:   01/13/25 141 kg (310 lb)   12/23/24 141 kg (310 lb 6.4 oz)   10/03/24 149 kg (327 lb 11.2 oz)        PHYSICAL " EXAM  Physical Exam  Constitutional:       Appearance: Normal appearance. He is obese.   HENT:      Head: Normocephalic and atraumatic.   Cardiovascular:      Rate and Rhythm: Normal rate and regular rhythm.      Heart sounds: No murmur heard.  Pulmonary:      Effort: Pulmonary effort is normal. No respiratory distress.      Breath sounds: Normal breath sounds. No wheezing.   Musculoskeletal:      Right lower leg: No edema.      Left lower leg: No edema.   Skin:     Findings: No rash.   Neurological:      General: No focal deficit present.      Mental Status: He is alert and oriented to person, place, and time. Mental status is at baseline.   Psychiatric:         Mood and Affect: Mood normal.         Behavior: Behavior normal.         Thought Content: Thought content normal.         Judgment: Judgment normal.          ASSESSMENT AND PLAN  Assessment/Plan   Problem List Items Addressed This Visit       Benign essential hypertension - Primary    Chronic pain syndrome    COPD (chronic obstructive pulmonary disease) (Multi)    Overview     Smoking cessation encouraged         Type 2 diabetes mellitus with other diabetic kidney complication    Long term (current) use of opiate analgesic    Chronic bilateral low back pain with right-sided sciatica    Relevant Medications    oxyCODONE-acetaminophen (Percocet) 5-325 mg tablet (Start on 2/1/2025)    oxyCODONE-acetaminophen (Percocet) 5-325 mg tablet (Start on 3/3/2025)    oxyCODONE-acetaminophen (Percocet) 5-325 mg tablet (Start on 4/2/2025)    DDD (degenerative disc disease), lumbar     Chronic low back pain with right sciatica, lumbar degenerative disc disease, chronic opiate use (Percocet) - has tried pain management injections in the past without improvement. Pain is currently well controlled with Percocet, approximately 3 tabs per day. Opioid risk score is 4 (3 points for family history of substance abuse ) - Jose Eduardo's pain is consistent with his disc disease and he gets  adequate relief from his medication. I do not believe he poses a high risk for misuse of medication. The Controlled Substance Agreement is up to date from 1/13/25.  OARRS reviewed.  Urine drug screen 10/3/24.  Narcan 10/3/24.  Percocet refilled.      Hypertension - not at goal, continue current medication and attempts at weight loss.  He declines additional medication.     Mixed hyperlipidemia & hypertriglyceridemia - continue statin, fenofibrate.     DM2 with microalbuminuria, nephropathy, uncontrolled - following with endocrinologist at [facility].        Patient permanently declines flu shot. Declines COVID vaccine.     Follow-up in 90 days.

## 2025-01-15 PROBLEM — I48.0 PAROXYSMAL ATRIAL FIBRILLATION (MULTI): Status: RESOLVED | Noted: 2023-09-07 | Resolved: 2025-01-15

## 2025-01-15 ASSESSMENT — ENCOUNTER SYMPTOMS
BACK PAIN: 1
DIZZINESS: 0
FEVER: 0
DIARRHEA: 0
CONSTIPATION: 0
ABDOMINAL PAIN: 0
NAUSEA: 0
LIGHT-HEADEDNESS: 0
WHEEZING: 0
DYSPHORIC MOOD: 1
CHILLS: 0
FATIGUE: 1
PALPITATIONS: 0
ARTHRALGIAS: 1
SHORTNESS OF BREATH: 0
COUGH: 0

## 2025-01-24 ENCOUNTER — TELEPHONE (OUTPATIENT)
Dept: PRIMARY CARE | Facility: CLINIC | Age: 55
End: 2025-01-24
Payer: COMMERCIAL

## 2025-01-24 NOTE — TELEPHONE ENCOUNTER
Pt called, he is sick. He has a lot of nasal congestion. He's on day 10 now. His son was in the ER for flu with the same symptoms.   Pt is asking for a prescription strength decongestant to be sent to the pharmacy. He has tried OTC and they have no worked. And a possible steroid.        Please advise.         [facility] - [place], [place] - [address] [address]  [address]  [facility] [address]  Phone: [phone] Fax: [phone]

## 2025-02-24 ENCOUNTER — TELEPHONE (OUTPATIENT)
Dept: PRIMARY CARE | Facility: CLINIC | Age: 55
End: 2025-02-24
Payer: COMMERCIAL

## 2025-02-24 DIAGNOSIS — L01.00 IMPETIGO: Primary | ICD-10-CM

## 2025-02-24 RX ORDER — MUPIROCIN 20 MG/G
OINTMENT TOPICAL 3 TIMES DAILY
Qty: 22 G | Refills: 0 | Status: SHIPPED | OUTPATIENT
Start: 2025-02-24 | End: 2025-03-06

## 2025-02-24 NOTE — TELEPHONE ENCOUNTER
Patient called requesting rx refill-mupirocin ointment USP 2 %.  Patient states he is leaving for [place] and has dermatitis or impetigo of his nose and face.      -   [facility] - [place], [place] - [address] [address]  [address]  [facility] [address]  Phone: [phone] Fax: [phone]    Patient [phone]

## 2025-02-24 NOTE — TELEPHONE ENCOUNTER
I called patient rx sent to pharmacy.  Patient asked that we make sure his pharmacy has rx for his pain medication.  Even though Dr. Kearney sent it to the pharmacy theres always an issue and patient has to call here.

## 2025-02-28 NOTE — TELEPHONE ENCOUNTER
I called patient's pharmacy and spoke with the pharmacist.  There should be no issue filling patients oxycodone on Monday and should be one of the first they fill.

## 2025-03-04 DIAGNOSIS — I10 ESSENTIAL (PRIMARY) HYPERTENSION: ICD-10-CM

## 2025-03-05 RX ORDER — LISINOPRIL 40 MG/1
40 TABLET ORAL DAILY
Qty: 90 TABLET | Refills: 3 | Status: SHIPPED | OUTPATIENT
Start: 2025-03-05

## 2025-03-31 ENCOUNTER — TELEPHONE (OUTPATIENT)
Dept: PRIMARY CARE | Facility: CLINIC | Age: 55
End: 2025-03-31
Payer: COMMERCIAL

## 2025-03-31 DIAGNOSIS — G89.29 OTHER CHRONIC PAIN: ICD-10-CM

## 2025-03-31 DIAGNOSIS — M54.41 LUMBAGO WITH SCIATICA, RIGHT SIDE: ICD-10-CM

## 2025-03-31 RX ORDER — IBUPROFEN 800 MG/1
800 TABLET ORAL EVERY 8 HOURS PRN
Qty: 90 TABLET | Refills: 3 | Status: SHIPPED | OUTPATIENT
Start: 2025-03-31

## 2025-04-03 ENCOUNTER — OFFICE VISIT (OUTPATIENT)
Dept: PRIMARY CARE | Facility: CLINIC | Age: 55
End: 2025-04-03
Payer: COMMERCIAL

## 2025-04-03 VITALS
SYSTOLIC BLOOD PRESSURE: 157 MMHG | TEMPERATURE: 96.8 F | DIASTOLIC BLOOD PRESSURE: 80 MMHG | HEIGHT: 73 IN | HEART RATE: 69 BPM | WEIGHT: 314.1 LBS | BODY MASS INDEX: 41.63 KG/M2

## 2025-04-03 DIAGNOSIS — M51.362 DEGENERATION OF INTERVERTEBRAL DISC OF LUMBAR REGION WITH DISCOGENIC BACK PAIN AND LOWER EXTREMITY PAIN: ICD-10-CM

## 2025-04-03 DIAGNOSIS — I10 BENIGN ESSENTIAL HYPERTENSION: Primary | ICD-10-CM

## 2025-04-03 DIAGNOSIS — E78.2 MIXED HYPERLIPIDEMIA: ICD-10-CM

## 2025-04-03 DIAGNOSIS — G89.4 CHRONIC PAIN SYNDROME: ICD-10-CM

## 2025-04-03 DIAGNOSIS — M54.41 CHRONIC BILATERAL LOW BACK PAIN WITH RIGHT-SIDED SCIATICA: ICD-10-CM

## 2025-04-03 DIAGNOSIS — Z91.199 POOR COMPLIANCE: ICD-10-CM

## 2025-04-03 DIAGNOSIS — E11.29 TYPE 2 DIABETES MELLITUS WITH OTHER DIABETIC KIDNEY COMPLICATION: ICD-10-CM

## 2025-04-03 DIAGNOSIS — E66.01 MORBID OBESITY WITH BMI OF 40.0-44.9, ADULT (MULTI): ICD-10-CM

## 2025-04-03 DIAGNOSIS — G89.29 CHRONIC BILATERAL LOW BACK PAIN WITH RIGHT-SIDED SCIATICA: ICD-10-CM

## 2025-04-03 RX ORDER — OXYCODONE AND ACETAMINOPHEN 5; 325 MG/1; MG/1
1 TABLET ORAL 3 TIMES DAILY PRN
Qty: 90 TABLET | Refills: 0 | Status: SHIPPED | OUTPATIENT
Start: 2025-06-02

## 2025-04-03 RX ORDER — OXYCODONE AND ACETAMINOPHEN 5; 325 MG/1; MG/1
1 TABLET ORAL 3 TIMES DAILY PRN
Qty: 90 TABLET | Refills: 0 | Status: SHIPPED | OUTPATIENT
Start: 2025-05-03

## 2025-04-03 RX ORDER — OXYCODONE AND ACETAMINOPHEN 5; 325 MG/1; MG/1
1 TABLET ORAL 3 TIMES DAILY PRN
Qty: 90 TABLET | Refills: 0 | Status: SHIPPED | OUTPATIENT
Start: 2025-04-03

## 2025-04-03 RX ORDER — ROSUVASTATIN CALCIUM 40 MG/1
40 TABLET, COATED ORAL EVERY EVENING
Qty: 90 TABLET | Refills: 3 | Status: SHIPPED | OUTPATIENT
Start: 2025-04-03 | End: 2026-04-03

## 2025-04-03 ASSESSMENT — ENCOUNTER SYMPTOMS
CONSTIPATION: 0
DYSPHORIC MOOD: 1
CHILLS: 0
ABDOMINAL PAIN: 0
COUGH: 0
WHEEZING: 0
DIZZINESS: 0
LIGHT-HEADEDNESS: 0
PALPITATIONS: 0
ARTHRALGIAS: 1
FATIGUE: 1
DIARRHEA: 0
BACK PAIN: 1
NAUSEA: 0
FEVER: 0
SHORTNESS OF BREATH: 0

## 2025-04-03 ASSESSMENT — PATIENT HEALTH QUESTIONNAIRE - PHQ9
2. FEELING DOWN, DEPRESSED OR HOPELESS: NOT AT ALL
SUM OF ALL RESPONSES TO PHQ9 QUESTIONS 1 AND 2: 0
1. LITTLE INTEREST OR PLEASURE IN DOING THINGS: NOT AT ALL

## 2025-04-03 NOTE — PROGRESS NOTES
CHIEF COMPLAINT  Pain    HISTORY OF PRESENT ILLNESS  Jose Eduardo Diaz is a 54 y.o. male presents today for follow up of Pain    HPI    Has been stable on oxycodone-acetaminophen for chronic pain, due for refills.    Follows with endocrinologist for diabetes and hypothyroidism.    High stress - home issues, weather, frustration with smoking, etc.  States he is bored, so he eats and smokes.  He sites the poor weather as the reason why he is bored, because he can't get outside to cut grass.    He is not interested in seeing a counselor.    Did not take his medications this morning.  States he usually takes his BP meds in the morning.     Percocet #90 filled 3/3/25    OARRS:  Sharron Kearney MD on 4/3/2025  8:06 AM  I have personally reviewed the OARRS report for Jose Eduardo Diaz. I have considered the risks of abuse, dependence, addiction and diversion and I believe that it is clinically appropriate for Jose Eduardo Diaz to be prescribed this medication    Is the patient prescribed a combination of a benzodiazepine and opioid?  No    Last Urine Drug Screen / ordered today: No  Recent Results (from the past 8760 hours)   Opiate Confirmation, Urine    Collection Time: 10/03/24 10:25 AM   Result Value Ref Range    6-Acetylmorphine <25 <25 ng/mL    Codeine <50 <50 ng/mL    Hydrocodone <25 <25 ng/mL    Hydromorphone <25 <25 ng/mL    Morphine  <50 <50 ng/mL    Norhydrocodone <25 <25 ng/mL    Noroxycodone 386 (H) <25 ng/mL    Oxycodone 339 (H) <25 ng/mL    Oxymorphone 568 (H) <25 ng/mL   Drug Screen, Urine With Reflex to Confirmation    Collection Time: 10/03/24 10:25 AM   Result Value Ref Range    Amphetamine Screen, Urine Presumptive Negative Presumptive Negative    Barbiturate Screen, Urine Presumptive Negative Presumptive Negative    Benzodiazepines Screen, Urine Presumptive Negative Presumptive Negative    Cannabinoid Screen, Urine Presumptive Negative Presumptive Negative    Cocaine Metabolite Screen, Urine Presumptive  Negative Presumptive Negative    Fentanyl Screen, Urine Presumptive Negative Presumptive Negative    Opiate Screen, Urine Presumptive Negative Presumptive Negative    Oxycodone Screen, Urine Presumptive Positive (A) Presumptive Negative    PCP Screen, Urine Presumptive Negative Presumptive Negative    Methadone Screen, Urine Presumptive Negative Presumptive Negative     Results are as expected.         Controlled Substance Agreement:  Date of the Last Agreement: 1/13/25  Reviewed Controlled Substance Agreement including but not limited to the benefits, risks, and alternatives to treatment with a Controlled Substance medication(s).    Opioids:  What is the patient's goal of therapy? Pain relief  Is this being achieved with current treatment? yes    I have calculated the patient's Morphine Dose Equivalent (MED):  22.5  I have considered referral to Pain Management and/or a specialist, and do not feel it is necessary at this time.    I feel that it is clinically indicated to continue this current medication regimen after consideration of alternative therapies, and other non-opioid treatment.    Opioid Risk Screening:  No change    Pain Assessment:  Analgesia  What was your pain level on average during the past week?: 3  What was your pain level at its worst during the past week?: 3  What percentage of your pain has been relieved during the past week?: 85 %  Is the amount of pain relief you are now obtaining from your current pain relievers enough to make a real difference in your life?: Y  Query to Clinician: Is the patient's pain relief clinically significant?: Yes    Activities of Daily Living  Physical Functioning: Better  Family Relationships: Better  Social Relationships: Better  Mood: Better  Sleep Patterns: Better  Overall Functioning: Better    Adverse Events  Is patient experiencing any side effects from current pain relievers?: Y  Fatigue: Mild      Assessment  Is your overall impression that this patient is  benefiting from opioid therapy?: Yes  Specific Analgesic Plan: Continue present regimen        REVIEW OF SYSTEMS  Review of Systems   Constitutional:  Positive for fatigue. Negative for chills and fever.   Respiratory:  Negative for cough, shortness of breath and wheezing.    Cardiovascular:  Negative for chest pain, palpitations and leg swelling.   Gastrointestinal:  Negative for abdominal pain, constipation, diarrhea and nausea.   Musculoskeletal:  Positive for arthralgias and back pain.   Neurological:  Negative for dizziness and light-headedness.   Psychiatric/Behavioral:  Positive for dysphoric mood.        ALLERGIES  Penicillins    MEDICATIONS  Current Outpatient Medications   Medication Instructions    albuterol 2.5 mg    amLODIPine (NORVASC) 10 mg, oral, Daily    apixaban (ELIQUIS) 5 mg, oral, 2 times daily    clotrimazole (Lotrimin) 1 % cream Topical, 2 times daily    ergocalciferol (VITAMIN D-2) 1,250 mcg, oral, Once Weekly    esomeprazole (NEXIUM) 40 mg, oral, Daily    famotidine (Pepcid) 40 mg tablet 1 tablet, Daily (0630)    fenofibrate (TRICOR) 145 mg, oral, Daily    ibuprofen 800 mg, oral, Every 8 hours PRN    lancets (OneTouch UltraSoft Lancets) misc Check blood glucose after fasting once daily    levothyroxine (SYNTHROID, LEVOXYL) 50 mcg, Daily    lisinopril 40 mg, oral, Daily    metoprolol succinate XL (Toprol-XL) 50 mg 24 hr tablet TAKE 3 TABLETS BY MOUTH EVERY MORNING AND TAKE 3 TABLETS BY MOUTH EVERY NIGHT.    multivitamin with minerals (Adults Multivitamin) tablet 1 tablet, Daily    naloxone (NARCAN) 4 mg, nasal, As needed    omega-3 acid ethyl esters (Lovaza) 1 gram capsule 2 capsules, 2 times daily    oxyCODONE-acetaminophen (Percocet) 5-325 mg tablet 1 tablet, oral, 3 times daily PRN    [START ON 5/3/2025] oxyCODONE-acetaminophen (Percocet) 5-325 mg tablet 1 tablet, oral, 3 times daily PRN    [START ON 6/2/2025] oxyCODONE-acetaminophen (Percocet) 5-325 mg tablet 1 tablet, oral, 3 times daily  "PRN    rosuvastatin (CRESTOR) 40 mg, oral, Every evening    tirzepatide 5 mg, Weekly    triamcinolone (Kenalog) 0.1 % cream APPLY A THIN LAYER TO AFFECTED AREA(S) TWICE DAILY. AVOID FACE, GENITALIA    ZINC ORAL Unsure of dose       TOBACCO USE  Social History     Tobacco Use   Smoking Status Every Day    Current packs/day: 1.50    Average packs/day: 1.5 packs/day for 20.0 years (30.0 ttl pk-yrs)    Types: Cigarettes   Smokeless Tobacco Never       DEPRESSION SCREEN  Over the past 2 weeks, how often have you been bothered by any of the following problems?  Little interest or pleasure in doing things: Not at all  Feeling down, depressed, or hopeless: Not at all    SURGICAL HISTORY  No past surgical history on file.       OBJECTIVE    /80   Pulse 69   Temp 36 °C (96.8 °F)   Ht 1.854 m (6' 1\")   Wt 142 kg (314 lb 1.6 oz)   BMI 41.44 kg/m²    BMI: Estimated body mass index is 41.44 kg/m² as calculated from the following:    Height as of this encounter: 1.854 m (6' 1\").    Weight as of this encounter: 142 kg (314 lb 1.6 oz).    BP Readings from Last 3 Encounters:   04/03/25 157/80   01/13/25 152/74   12/23/24 149/79      Wt Readings from Last 3 Encounters:   04/03/25 142 kg (314 lb 1.6 oz)   01/13/25 141 kg (310 lb)   12/23/24 141 kg (310 lb 6.4 oz)        PHYSICAL EXAM  Physical Exam  Constitutional:       Appearance: Normal appearance. He is obese.   HENT:      Head: Normocephalic and atraumatic.   Cardiovascular:      Rate and Rhythm: Normal rate and regular rhythm.      Heart sounds: No murmur heard.  Pulmonary:      Effort: Pulmonary effort is normal. No respiratory distress.      Breath sounds: Normal breath sounds. No wheezing.   Musculoskeletal:      Right lower leg: No edema.      Left lower leg: No edema.   Neurological:      General: No focal deficit present.      Mental Status: He is alert and oriented to person, place, and time. Mental status is at baseline.   Psychiatric:      Comments: " Pessimistic disposition          ASSESSMENT AND PLAN  Assessment/Plan   Problem List Items Addressed This Visit       Benign essential hypertension - Primary    Chronic pain syndrome    Mixed hyperlipidemia    Relevant Medications    rosuvastatin (Crestor) 40 mg tablet    Poor compliance    Type 2 diabetes mellitus with other diabetic kidney complication    Morbid obesity with BMI of 40.0-44.9, adult (Multi)    Chronic bilateral low back pain with right-sided sciatica    Relevant Medications    oxyCODONE-acetaminophen (Percocet) 5-325 mg tablet    oxyCODONE-acetaminophen (Percocet) 5-325 mg tablet (Start on 5/3/2025)    oxyCODONE-acetaminophen (Percocet) 5-325 mg tablet (Start on 6/2/2025)    DDD (degenerative disc disease), lumbar       Chronic low back pain with right sciatica, lumbar degenerative disc disease, chronic opiate use (Percocet) - has tried pain management injections in the past without improvement. Pain is currently well controlled with Percocet, approximately 3 tabs per day. Opioid risk score is 4 (3 points for family history of substance abuse ) - Jose Eduardo's pain is consistent with his disc disease and he gets adequate relief from his medication. I do not believe he poses a high risk for misuse of medication. The Controlled Substance Agreement is up to date from 1/13/25.  OARRS reviewed.  Urine drug screen 10/3/24.  Narcan 10/3/24.  Percocet refilled.      Hypertension - not at goal, continue current medication and attempts at weight loss.  - did not take his medications today.     Mixed hyperlipidemia & hypertriglyceridemia - continue statin, fenofibrate.     DM2 with microalbuminuria, nephropathy, uncontrolled - following with endocrinologist at [facility].        Patient permanently declines flu shot.  Declines COVID vaccine.     Follow-up in 90 days.

## 2025-04-14 ENCOUNTER — APPOINTMENT (OUTPATIENT)
Dept: PRIMARY CARE | Facility: CLINIC | Age: 55
End: 2025-04-14
Payer: COMMERCIAL

## 2025-04-21 ENCOUNTER — TELEPHONE (OUTPATIENT)
Dept: PRIMARY CARE | Facility: CLINIC | Age: 55
End: 2025-04-21
Payer: COMMERCIAL

## 2025-04-21 ENCOUNTER — PATIENT OUTREACH (OUTPATIENT)
Dept: PRIMARY CARE | Facility: CLINIC | Age: 55
End: 2025-04-21
Payer: COMMERCIAL

## 2025-04-21 RX ORDER — DILTIAZEM HYDROCHLORIDE 240 MG/1
240 CAPSULE, COATED, EXTENDED RELEASE ORAL DAILY
COMMUNITY

## 2025-04-21 NOTE — TELEPHONE ENCOUNTER
----- Message from Brenda Deutsch sent at 4/21/2025 12:12 PM EDT -----  Regarding: TCM needed  Hello,Can you please contact pt to schedule hospital follow up within 14 days of discharge.[facility]HyperglycemiaAtrial fibrillation with RVR Admission Date:4/16/25Discharge Date: 4/18/25Thank rashida,Brenda Deutsch LPN Care Transitions Specialist

## 2025-04-21 NOTE — TELEPHONE ENCOUNTER
Seen in [facility] for Afib. Cardiologist is suggesting he call you for patches and lozenges to quit smoking as well as a sleep study    Pharm   [facility]

## 2025-04-21 NOTE — PROGRESS NOTES
Discharge Facility:  [facility]  Discharge Diagnosis:  Hyperglycemia  Atrial fibrillation with RVR   Admission Date:  4/16/25  Discharge Date:   4/18/25    PCP Appointment Date:  TBD-I am unable to make an appt due to no openings . Message sent to office staff requesting assistance.     Appointment with Sharron Kearney MD (07/03/2025)   Specialist Appointment Date:   4/23/2025  8:40 AM   Tasneem Harris MD  Endocrinology    Patient on waiting list to see Dr Evans Jay for hospital follow up-Cardiologist at [facility] because soonest appt is 8/23/25 (currently scheduled)   Hospital Encounter and Summary Linked: Yes  Apr 17  Hospital Encounter with Mateus Diaz DO; Michael Lai MD   See discharge assessment below for further details  Patient was admitted for Afib RVR. Admitted to [facility] for Afib with RVR, treated with cardizem infusion and   PO Cardizem/Metoprolol, was scheduled to undergo cardioversion however in cath lab was noted to be in NSR. He was resumed on home medications except Jardiance, follow-up with Endocrinology for diabetes management.   Wrap Up  Wrap Up Additional Comments: Successful transition of care outreach within 2 business days of discharge. CM introduced myself and the TCM program.   CM gave my contact information and encouraged to call if needing assistance or has any further non-emergent questions prior to my next outreach.   Reviewed hospital stay and answered any questions.   Cardiologist is suggesting to get patches and lozenges to quit smoking as well as a schedule a sleep study through Primary care. He went from 2 packs a day down to about 6 cig a day but is feeling the urge to increase again so really wants to stop the urges  and hoping patches can help. He has message into Dr Canela already this morning and waiting on call back. I reviewed schedule but did not have available opening in next 14 days to see her. Patient is following up with [place] [facility]  week and working to get sooner appt with cardio Dr Jay. (4/21/2025 12:27 PM)    Medications  Medications reviewed with patient/caregiver?: Yes (4/21/2025 12:27 PM)  Is the patient having any side effects they believe may be caused by any medication additions or changes?: No (4/21/2025 12:27 PM)  Does the patient have all medications ordered at discharge?: Yes (4/21/2025 12:27 PM)  Care Management Interventions: Provided patient education (4/21/2025 12:27 PM)  Prescription Comments: per hospital records --dilTIAZem  mg 24 hr capsule Commonly known as: CARDIZEM CD, CARTIA XT Take 1 capsule by mouth once daily. Patient should start on April 19, 2025. Start taking on: April 19, 2025     STOP taking these medications   empagliflozin 25 mg tablet Commonly known as: JARDIANCE  oxyCODONE-acetaminophen 5-325 mg tablet Commonly known as: PERCOCET (4/21/2025 12:27 PM)  Is the patient taking all medications as directed (includes completed medication regime)?: Yes (4/21/2025 12:27 PM)  Medication Comments: reviewed discharge med list with patient. He is taking new medication -Cardizem. As far as stopping Jardiance , he has been off of it a long time and explained that they gave it to him in the hospital again when they should not have. I noted that it said to stop percocet. He said that he takes it 3 times a day and needs it for pain or he can not function.Not sure is why it is listed to stop because was never told to stop. message into primary care for patches and Lozenges for smoking. (4/21/2025 12:27 PM)    Appointments  Does the patient have a primary care provider?: Yes (4/21/2025 12:27 PM)  Care Management Interventions: Advised patient to make appointment (4/21/2025 12:27 PM)  Has the patient kept scheduled appointments due by today?: Yes (4/21/2025 12:27 PM)  Care Management Interventions: Educated on importance of keeping appointment; Advised to schedule with specialist (4/21/2025 12:27 PM)    Self  Management  Has home health visited the patient within 72 hours of discharge?: Not applicable (4/21/2025 12:27 PM)  What Durable Medical Equipment (DME) was ordered?: na (4/21/2025 12:27 PM)    Patient Teaching  Does the patient have access to their discharge instructions?: Yes (4/21/2025 12:27 PM)  Care Management Interventions: Reviewed instructions with patient (4/21/2025 12:27 PM)  What is the patient's perception of their health status since discharge?: Improving (4/21/2025 12:27 PM)  Is the patient/caregiver able to teach back the hierarchy of who to call/visit for symptoms/problems? PCP, Specialist, Home Health nurse, Urgent Care, ED, 911: Yes (4/21/2025 12:27 PM)  If the patient is a current smoker, are they able to teach back resources for cessation?: Smoking cessation medications (4/21/2025 12:27 PM)

## 2025-04-22 DIAGNOSIS — R06.83 SNORING: Primary | ICD-10-CM

## 2025-04-22 DIAGNOSIS — Z72.0 TOBACCO ABUSE: ICD-10-CM

## 2025-04-22 DIAGNOSIS — I48.91 ATRIAL FIBRILLATION, UNSPECIFIED TYPE (MULTI): ICD-10-CM

## 2025-04-22 RX ORDER — NICOTINE POLACRILEX 4 MG/1
4 LOZENGE ORAL EVERY 2 HOUR PRN
Qty: 100 LOZENGE | Refills: 5 | Status: SHIPPED | OUTPATIENT
Start: 2025-04-22 | End: 2025-05-22

## 2025-04-22 RX ORDER — IBUPROFEN 200 MG
1 TABLET ORAL EVERY 24 HOURS
Qty: 30 PATCH | Refills: 1 | Status: SHIPPED | OUTPATIENT
Start: 2025-04-22 | End: 2025-06-21

## 2025-04-28 DIAGNOSIS — K21.9 GASTRO-ESOPHAGEAL REFLUX DISEASE WITHOUT ESOPHAGITIS: ICD-10-CM

## 2025-04-28 DIAGNOSIS — I48.91 UNSPECIFIED ATRIAL FIBRILLATION (MULTI): ICD-10-CM

## 2025-04-28 RX ORDER — APIXABAN 5 MG/1
5 TABLET, FILM COATED ORAL 2 TIMES DAILY
Qty: 180 TABLET | Refills: 3 | Status: SHIPPED | OUTPATIENT
Start: 2025-04-28

## 2025-04-28 RX ORDER — ESOMEPRAZOLE MAGNESIUM 40 MG/1
40 CAPSULE, DELAYED RELEASE ORAL DAILY
Qty: 90 CAPSULE | Refills: 3 | Status: SHIPPED | OUTPATIENT
Start: 2025-04-28

## 2025-05-07 ENCOUNTER — PATIENT OUTREACH (OUTPATIENT)
Dept: PRIMARY CARE | Facility: CLINIC | Age: 55
End: 2025-05-07
Payer: COMMERCIAL

## 2025-05-07 ENCOUNTER — TELEPHONE (OUTPATIENT)
Dept: PRIMARY CARE | Facility: CLINIC | Age: 55
End: 2025-05-07
Payer: COMMERCIAL

## 2025-05-07 NOTE — TELEPHONE ENCOUNTER
Patient called for refill of cardizem 240mg. Has appointment with cardiologist in Aug, needs refill till see them. Not sure of dose, having pharmacy fax over request

## 2025-05-07 NOTE — PROGRESS NOTES
Confirmation of at least 2 patient identifiers.    Completed telephonic follow-up with patient approximately 14 days post discharge, no PCP follow up.   Spoke to patient during outreach call.    Patient reports feeling: No change from previous encounter     Patient has questions or concerns about medications: Yes - Needs refill on Cardizem     Have all prescribed medications been filled?     Patient has necessary resources to manage their care? Yes    Patient has questions or concerns? Yes - Pt is unable to get into cardiology follow up until Aug. Appt is sent but he has never seen Dr Jay besides in the hospital. He is going to run out of Cardize. Pharm reached out to provider who gave him 30 day scripted at hospital but they said he would need to get refills outpatient. I suggested calling Dr Jay office and explain situation and see if they will refill to appt.     8/28/2025  8:30 AM   Evans Jay MD  Cardiology [phone]     If unable to get from cardio then can try to see if Dr Kearney can give short Rx to get to appt. I offered to schedule appt with Dr Kearney today for hospital follow up but pt is out of town. Patient will let me know if he needs more assistance .     Next care management follow-up approximately within one month.  Care  information provided to patient.

## 2025-05-09 ENCOUNTER — TELEPHONE (OUTPATIENT)
Dept: RHEUMATOLOGY | Facility: CLINIC | Age: 55
End: 2025-05-09
Payer: COMMERCIAL

## 2025-05-09 DIAGNOSIS — I48.0 PAROXYSMAL ATRIAL FIBRILLATION (MULTI): Primary | ICD-10-CM

## 2025-05-09 RX ORDER — DILTIAZEM HYDROCHLORIDE 240 MG/1
240 CAPSULE, COATED, EXTENDED RELEASE ORAL DAILY
Qty: 30 CAPSULE | Refills: 0 | Status: SHIPPED | OUTPATIENT
Start: 2025-05-09

## 2025-06-02 DIAGNOSIS — I48.0 PAROXYSMAL ATRIAL FIBRILLATION (MULTI): ICD-10-CM

## 2025-06-02 RX ORDER — DILTIAZEM HYDROCHLORIDE 240 MG/1
240 CAPSULE, COATED, EXTENDED RELEASE ORAL DAILY
Qty: 30 CAPSULE | Refills: 0 | Status: SHIPPED | OUTPATIENT
Start: 2025-06-02

## 2025-06-12 ENCOUNTER — APPOINTMENT (OUTPATIENT)
Dept: SLEEP MEDICINE | Facility: CLINIC | Age: 55
End: 2025-06-12
Payer: COMMERCIAL

## 2025-06-12 ENCOUNTER — PATIENT OUTREACH (OUTPATIENT)
Dept: PRIMARY CARE | Facility: CLINIC | Age: 55
End: 2025-06-12
Payer: COMMERCIAL

## 2025-06-12 NOTE — PROGRESS NOTES
Unable to reach patient for one month discharge follow up call.   Left voicemail with call back number for patient to call if needed  to assist with any questions or concerns patient may have.

## 2025-07-03 ENCOUNTER — APPOINTMENT (OUTPATIENT)
Dept: PRIMARY CARE | Facility: CLINIC | Age: 55
End: 2025-07-03
Payer: COMMERCIAL

## 2025-07-03 VITALS
TEMPERATURE: 97.4 F | DIASTOLIC BLOOD PRESSURE: 74 MMHG | HEART RATE: 70 BPM | SYSTOLIC BLOOD PRESSURE: 137 MMHG | HEIGHT: 73 IN | BODY MASS INDEX: 41.38 KG/M2 | WEIGHT: 312.2 LBS | OXYGEN SATURATION: 97 %

## 2025-07-03 DIAGNOSIS — E78.1 HYPERTRIGLYCERIDEMIA: ICD-10-CM

## 2025-07-03 DIAGNOSIS — I10 BENIGN ESSENTIAL HYPERTENSION: ICD-10-CM

## 2025-07-03 DIAGNOSIS — L30.9 ECZEMA, UNSPECIFIED TYPE: ICD-10-CM

## 2025-07-03 DIAGNOSIS — M54.41 CHRONIC BILATERAL LOW BACK PAIN WITH RIGHT-SIDED SCIATICA: Primary | ICD-10-CM

## 2025-07-03 DIAGNOSIS — M25.551 RIGHT HIP PAIN: ICD-10-CM

## 2025-07-03 DIAGNOSIS — M54.16 RADICULOPATHY, LUMBAR REGION: ICD-10-CM

## 2025-07-03 DIAGNOSIS — G89.29 CHRONIC BILATERAL LOW BACK PAIN WITH RIGHT-SIDED SCIATICA: Primary | ICD-10-CM

## 2025-07-03 DIAGNOSIS — G89.4 CHRONIC PAIN SYNDROME: ICD-10-CM

## 2025-07-03 RX ORDER — OXYCODONE AND ACETAMINOPHEN 5; 325 MG/1; MG/1
1 TABLET ORAL 3 TIMES DAILY PRN
Qty: 90 TABLET | Refills: 0 | Status: SHIPPED | OUTPATIENT
Start: 2025-09-01

## 2025-07-03 RX ORDER — GLIMEPIRIDE 2 MG/1
2 TABLET ORAL
COMMUNITY
Start: 2025-05-31

## 2025-07-03 RX ORDER — DIAPER,BRIEF,INFANT-TODD,DISP
EACH MISCELLANEOUS 2 TIMES DAILY
Qty: 56 G | Refills: 0 | Status: SHIPPED | OUTPATIENT
Start: 2025-07-03

## 2025-07-03 RX ORDER — OXYCODONE AND ACETAMINOPHEN 5; 325 MG/1; MG/1
1 TABLET ORAL 3 TIMES DAILY PRN
Qty: 90 TABLET | Refills: 0 | Status: SHIPPED | OUTPATIENT
Start: 2025-07-03

## 2025-07-03 RX ORDER — OXYCODONE AND ACETAMINOPHEN 5; 325 MG/1; MG/1
1 TABLET ORAL 3 TIMES DAILY PRN
Qty: 90 TABLET | Refills: 0 | Status: SHIPPED | OUTPATIENT
Start: 2025-08-02

## 2025-07-03 RX ORDER — INSULIN GLARGINE 100 [IU]/ML
20 INJECTION, SOLUTION SUBCUTANEOUS
COMMUNITY
Start: 2025-05-31

## 2025-07-03 RX ORDER — TIRZEPATIDE 7.5 MG/.5ML
INJECTION, SOLUTION SUBCUTANEOUS
COMMUNITY
Start: 2025-06-28

## 2025-07-03 ASSESSMENT — ENCOUNTER SYMPTOMS
CONSTIPATION: 0
DIZZINESS: 0
ABDOMINAL PAIN: 0
DIARRHEA: 0
COUGH: 0
BACK PAIN: 1
NAUSEA: 0
ARTHRALGIAS: 1
CHILLS: 0
WHEEZING: 0
DYSPHORIC MOOD: 1
SHORTNESS OF BREATH: 0
LIGHT-HEADEDNESS: 0
PALPITATIONS: 0
FEVER: 0

## 2025-07-03 ASSESSMENT — PATIENT HEALTH QUESTIONNAIRE - PHQ9
2. FEELING DOWN, DEPRESSED OR HOPELESS: SEVERAL DAYS
SUM OF ALL RESPONSES TO PHQ9 QUESTIONS 1 AND 2: 2
1. LITTLE INTEREST OR PLEASURE IN DOING THINGS: SEVERAL DAYS

## 2025-07-03 NOTE — PROGRESS NOTES
CHIEF COMPLAINT  Pain    HISTORY OF PRESENT ILLNESS  Jose Eduardo Diaz is a 55 y.o. male presents today for follow up of Pain    HPI    Rash on face.  Started yesterday.  Was on riding mower, cutting grass.  Rash is itchy.  He takes an OTC antihistamine - not sure which one or what dosage.  Has happened to him intermittently in the past.  No apparent trigger.    States his blood sugar has been high since he was in hospital for Afib.  A1c has not changed much, appears typically 8-9.   He is trying to make healthier food choices.  Unable to exercise.   His endocrinologist is on maternity leave.    Last fill 6/1/25 Percocet 5/325 mg, #90.  22.50 MME    OARRS:  Sharron Kearney MD on 7/3/2025  7:58 AM  I have personally reviewed the OARRS report for Jose Eduardo Diaz. I have considered the risks of abuse, dependence, addiction and diversion and I believe that it is clinically appropriate for Jose Eduardo Diaz to be prescribed this medication    Is the patient prescribed a combination of a benzodiazepine and opioid?  No    Last Urine Drug Screen / ordered today: No  Recent Results (from the past 8760 hours)   Opiate Confirmation, Urine    Collection Time: 10/03/24 10:25 AM   Result Value Ref Range    6-Acetylmorphine <25 <25 ng/mL    Codeine <50 <50 ng/mL    Hydrocodone <25 <25 ng/mL    Hydromorphone <25 <25 ng/mL    Morphine  <50 <50 ng/mL    Norhydrocodone <25 <25 ng/mL    Noroxycodone 386 (H) <25 ng/mL    Oxycodone 339 (H) <25 ng/mL    Oxymorphone 568 (H) <25 ng/mL   Drug Screen, Urine With Reflex to Confirmation    Collection Time: 10/03/24 10:25 AM   Result Value Ref Range    Amphetamine Screen, Urine Presumptive Negative Presumptive Negative    Barbiturate Screen, Urine Presumptive Negative Presumptive Negative    Benzodiazepines Screen, Urine Presumptive Negative Presumptive Negative    Cannabinoid Screen, Urine Presumptive Negative Presumptive Negative    Cocaine Metabolite Screen, Urine Presumptive Negative Presumptive  Negative    Fentanyl Screen, Urine Presumptive Negative Presumptive Negative    Opiate Screen, Urine Presumptive Negative Presumptive Negative    Oxycodone Screen, Urine Presumptive Positive (A) Presumptive Negative    PCP Screen, Urine Presumptive Negative Presumptive Negative    Methadone Screen, Urine Presumptive Negative Presumptive Negative     Results are as expected.         Controlled Substance Agreement:  Date of the Last Agreement: 1/13/25  Reviewed Controlled Substance Agreement including but not limited to the benefits, risks, and alternatives to treatment with a Controlled Substance medication(s).    Opioids:  What is the patient's goal of therapy? Pain relief from chronic injuries  Is this being achieved with current treatment? Partially     I have calculated the patient's Morphine Dose Equivalent (MED): 22.5  I have considered referral to Pain Management and/or a specialist, and do not feel it is necessary at this time.    I feel that it is clinically indicated to continue this current medication regimen after consideration of alternative therapies, and other non-opioid treatment.    Opioid Risk Screening:  No changes    Pain Assessment:  Analgesia  What was your pain level on average during the past week?: 3  What was your pain level at its worst during the past week?: 6  What percentage of your pain has been relieved during the past week?: 80 %  Is the amount of pain relief you are now obtaining from your current pain relievers enough to make a real difference in your life?: Y  Query to Clinician: Is the patient's pain relief clinically significant?: Yes    Activities of Daily Living  Physical Functioning: Better  Family Relationships: Better  Social Relationships: Better  Mood: Better  Sleep Patterns: Better  Overall Functioning: Better    Adverse Events  Is patient experiencing any side effects from current pain relievers?: Y  Fatigue: Moderate      Assessment  Is your overall impression that this  patient is benefiting from opioid therapy?: Yes  Specific Analgesic Plan: Continue present regimen          REVIEW OF SYSTEMS  Review of Systems   Constitutional:  Negative for chills and fever.   Respiratory:  Negative for cough, shortness of breath and wheezing.    Cardiovascular:  Negative for chest pain, palpitations and leg swelling.   Gastrointestinal:  Negative for abdominal pain, constipation, diarrhea and nausea.   Musculoskeletal:  Positive for arthralgias and back pain.   Skin:  Positive for rash.   Neurological:  Negative for dizziness and light-headedness.   Psychiatric/Behavioral:  Positive for dysphoric mood.        ALLERGIES  Penicillins    MEDICATIONS  Current Outpatient Medications   Medication Instructions    albuterol 2.5 mg    amLODIPine (NORVASC) 10 mg, oral, Daily    clotrimazole (Lotrimin) 1 % cream Topical, 2 times daily    dilTIAZem CD (CARDIZEM CD) 240 mg, oral, Daily    Eliquis 5 mg, oral, 2 times daily    ergocalciferol (VITAMIN D-2) 1,250 mcg, oral, Once Weekly    esomeprazole (NEXIUM) 40 mg, oral, Daily    famotidine (Pepcid) 40 mg tablet 1 tablet, Daily (0630)    fenofibrate (TRICOR) 145 mg, oral, Daily    glimepiride (AMARYL) 2 mg, 2 times daily (morning and late afternoon)    hydrocortisone 0.5 % cream Topical, 2 times daily    ibuprofen 800 mg, oral, Every 8 hours PRN    lancets (OneTouch UltraSoft Lancets) misc Check blood glucose after fasting once daily    Lantus Solostar U-100 Insulin 20 Units    levothyroxine (SYNTHROID, LEVOXYL) 50 mcg, Daily    lisinopril 40 mg, oral, Daily    metoprolol succinate XL (Toprol-XL) 50 mg 24 hr tablet TAKE 3 TABLETS BY MOUTH EVERY MORNING AND TAKE 3 TABLETS BY MOUTH EVERY NIGHT.    Mounjaro 7.5 mg/0.5 mL pen injector     multivitamin with minerals (Adults Multivitamin) tablet 1 tablet, Daily    naloxone (NARCAN) 4 mg, nasal, As needed    nicotine (Nicoderm CQ) 21 mg/24 hr patch 1 patch, transdermal, Every 24 hours    nicotine polacrilex (COMMIT)  "4 mg, Mouth/Throat, Every 2 hour PRN    omega-3 acid ethyl esters (Lovaza) 1 gram capsule 2 capsules, 2 times daily    [START ON 9/1/2025] oxyCODONE-acetaminophen (Percocet) 5-325 mg tablet 1 tablet, oral, 3 times daily PRN    [START ON 8/2/2025] oxyCODONE-acetaminophen (Percocet) 5-325 mg tablet 1 tablet, oral, 3 times daily PRN    oxyCODONE-acetaminophen (Percocet) 5-325 mg tablet 1 tablet, oral, 3 times daily PRN    rosuvastatin (CRESTOR) 40 mg, oral, Every evening    triamcinolone (Kenalog) 0.1 % cream APPLY A THIN LAYER TO AFFECTED AREA(S) TWICE DAILY. AVOID FACE, GENITALIA    ZINC ORAL Unsure of dose       TOBACCO USE  Tobacco Use History[1]    DEPRESSION SCREEN  Over the past 2 weeks, how often have you been bothered by any of the following problems?  Little interest or pleasure in doing things: Several days  Feeling down, depressed, or hopeless: Several days    SURGICAL HISTORY  No past surgical history on file.       OBJECTIVE    /74   Pulse 70   Temp 36.3 °C (97.4 °F)   Ht 1.854 m (6' 1\")   Wt 142 kg (312 lb 3.2 oz)   SpO2 97%   BMI 41.19 kg/m²    BMI: Estimated body mass index is 41.19 kg/m² as calculated from the following:    Height as of this encounter: 1.854 m (6' 1\").    Weight as of this encounter: 142 kg (312 lb 3.2 oz).    BP Readings from Last 3 Encounters:   07/03/25 137/74   04/03/25 157/80   01/13/25 152/74      Wt Readings from Last 3 Encounters:   07/03/25 142 kg (312 lb 3.2 oz)   04/03/25 142 kg (314 lb 1.6 oz)   01/13/25 141 kg (310 lb)        PHYSICAL EXAM  Physical Exam  Constitutional:       Appearance: Normal appearance. He is obese.   HENT:      Head: Normocephalic and atraumatic.   Cardiovascular:      Rate and Rhythm: Normal rate and regular rhythm.      Heart sounds: No murmur heard.  Pulmonary:      Effort: Pulmonary effort is normal. No respiratory distress.      Breath sounds: Normal breath sounds. No wheezing.   Musculoskeletal:      Right lower leg: No edema.      " Left lower leg: No edema.   Skin:     Findings: Rash present.      Comments: General facial erythema, nonspecific.  Some excoriations on right lower face.   Neurological:      General: No focal deficit present.      Mental Status: He is alert and oriented to person, place, and time. Mental status is at baseline.   Psychiatric:      Comments: Pessimistic disposition          ASSESSMENT AND PLAN  Assessment/Plan   Problem List Items Addressed This Visit       Benign essential hypertension    Chronic pain syndrome    Right hip pain    Chronic bilateral low back pain with right-sided sciatica - Primary    Relevant Medications    oxyCODONE-acetaminophen (Percocet) 5-325 mg tablet (Start on 9/1/2025)    oxyCODONE-acetaminophen (Percocet) 5-325 mg tablet (Start on 8/2/2025)    oxyCODONE-acetaminophen (Percocet) 5-325 mg tablet    Hypertriglyceridemia    Eczema    Relevant Medications    hydrocortisone 0.5 % cream    Other Relevant Orders    Referral to Dermatology    Radiculopathy, lumbar region     Facial rash - eczema v. Contact dermatitis or photodermatitis.  Rx sent for low potency hydrocortisone cream.  Continue OTC antihistamine.  Keep diary of symptoms and potential triggers.  Referral to dermatologist.     Chronic low back pain with right sciatica, lumbar degenerative disc disease, chronic opiate use (Percocet) - has tried pain management injections in the past without improvement. Pain is currently well controlled with Percocet, approximately 3 tabs per day. Opioid risk score is 4 (3 points for family history of substance abuse ) - Jose Eduardo's pain is consistent with his disc disease and he gets adequate relief from his medication. I do not believe he poses a high risk for misuse of medication. The Controlled Substance Agreement is up to date from 1/13/25.  OARRS reviewed.  Urine drug screen 10/3/24.  Narcan 10/3/24.  Percocet refilled.      Hypertension - at goal, continue current medication and attempts at weight  loss.    Mixed hyperlipidemia & hypertriglyceridemia - continue statin, fenofibrate.  - most recent TG > 1000.  Encouraged him to cut back on simple carbs and work on diabetes control, weight loss.      DM2 with microalbuminuria, nephropathy, uncontrolled - following with endocrinologist at [facility].        Patient permanently declines flu shot.  Declines COVID vaccine.     Follow-up in 90 days.            [1]   Social History  Tobacco Use   Smoking Status Every Day    Current packs/day: 1.50    Average packs/day: 1.5 packs/day for 20.0 years (30.0 ttl pk-yrs)    Types: Cigarettes   Smokeless Tobacco Never

## 2025-07-07 DIAGNOSIS — I48.0 PAROXYSMAL ATRIAL FIBRILLATION (MULTI): ICD-10-CM

## 2025-07-07 RX ORDER — DILTIAZEM HYDROCHLORIDE 240 MG/1
240 CAPSULE, COATED, EXTENDED RELEASE ORAL DAILY
Qty: 30 CAPSULE | Refills: 0 | Status: SHIPPED | OUTPATIENT
Start: 2025-07-07

## 2025-07-16 ENCOUNTER — PATIENT OUTREACH (OUTPATIENT)
Dept: PRIMARY CARE | Facility: CLINIC | Age: 55
End: 2025-07-16
Payer: COMMERCIAL

## 2025-07-16 NOTE — PROGRESS NOTES
Final call. Called and spoke with patient to address any questions or concerns regarding hospitalization.   Patient reports their condition has been stable. Saw PCP on 7/3/25 and has upcoming cardio appt in Aug. .   Patient encouraged to keep my contact information in case any needs arise.

## 2025-08-04 DIAGNOSIS — G89.29 OTHER CHRONIC PAIN: ICD-10-CM

## 2025-08-04 DIAGNOSIS — M54.41 LUMBAGO WITH SCIATICA, RIGHT SIDE: ICD-10-CM

## 2025-08-04 DIAGNOSIS — I48.0 PAROXYSMAL ATRIAL FIBRILLATION (MULTI): ICD-10-CM

## 2025-08-04 RX ORDER — DILTIAZEM HYDROCHLORIDE 240 MG/1
240 CAPSULE, COATED, EXTENDED RELEASE ORAL DAILY
Qty: 30 CAPSULE | Refills: 0 | Status: SHIPPED | OUTPATIENT
Start: 2025-08-04

## 2025-08-04 RX ORDER — IBUPROFEN 800 MG/1
800 TABLET, FILM COATED ORAL EVERY 8 HOURS PRN
Qty: 90 TABLET | Refills: 0 | Status: SHIPPED | OUTPATIENT
Start: 2025-08-04

## 2025-08-13 ENCOUNTER — APPOINTMENT (OUTPATIENT)
Dept: SLEEP MEDICINE | Facility: CLINIC | Age: 55
End: 2025-08-13
Payer: COMMERCIAL

## 2025-08-28 ENCOUNTER — TELEPHONE (OUTPATIENT)
Dept: PRIMARY CARE | Facility: CLINIC | Age: 55
End: 2025-08-28
Payer: COMMERCIAL

## 2025-08-28 DIAGNOSIS — E78.1 HYPERTRIGLYCERIDEMIA: ICD-10-CM

## 2025-08-28 DIAGNOSIS — I48.0 PAROXYSMAL ATRIAL FIBRILLATION (MULTI): ICD-10-CM

## 2025-08-28 DIAGNOSIS — K04.7 DENTAL ABSCESS: Primary | ICD-10-CM

## 2025-08-28 DIAGNOSIS — E78.2 MIXED HYPERLIPIDEMIA: ICD-10-CM

## 2025-08-28 DIAGNOSIS — I10 BENIGN ESSENTIAL HYPERTENSION: ICD-10-CM

## 2025-08-28 RX ORDER — CLINDAMYCIN HYDROCHLORIDE 300 MG/1
300 CAPSULE ORAL 4 TIMES DAILY
Qty: 28 CAPSULE | Refills: 0 | Status: SHIPPED | OUTPATIENT
Start: 2025-08-28 | End: 2025-09-04

## 2025-09-02 ENCOUNTER — TELEPHONE (OUTPATIENT)
Dept: PRIMARY CARE | Facility: CLINIC | Age: 55
End: 2025-09-02
Payer: COMMERCIAL

## 2025-09-02 RX ORDER — METOPROLOL SUCCINATE 50 MG/1
150 TABLET, EXTENDED RELEASE ORAL 2 TIMES DAILY
Qty: 540 TABLET | Refills: 3 | Status: SHIPPED | OUTPATIENT
Start: 2025-09-02

## 2025-09-02 RX ORDER — FENOFIBRATE 145 MG/1
145 TABLET, FILM COATED ORAL DAILY
Qty: 90 TABLET | Refills: 3 | Status: SHIPPED | OUTPATIENT
Start: 2025-09-02 | End: 2026-09-02

## 2025-10-06 ENCOUNTER — APPOINTMENT (OUTPATIENT)
Dept: PRIMARY CARE | Facility: CLINIC | Age: 55
End: 2025-10-06
Payer: COMMERCIAL